# Patient Record
Sex: FEMALE | Race: WHITE | URBAN - METROPOLITAN AREA
[De-identification: names, ages, dates, MRNs, and addresses within clinical notes are randomized per-mention and may not be internally consistent; named-entity substitution may affect disease eponyms.]

---

## 2021-08-27 ENCOUNTER — APPOINTMENT (OUTPATIENT)
Dept: GENERAL RADIOLOGY | Age: 51
DRG: 177 | End: 2021-08-27
Attending: STUDENT IN AN ORGANIZED HEALTH CARE EDUCATION/TRAINING PROGRAM
Payer: COMMERCIAL

## 2021-08-27 ENCOUNTER — HOSPITAL ENCOUNTER (INPATIENT)
Age: 51
LOS: 4 days | Discharge: HOME | DRG: 177 | End: 2021-08-31
Attending: STUDENT IN AN ORGANIZED HEALTH CARE EDUCATION/TRAINING PROGRAM | Admitting: INTERNAL MEDICINE
Payer: COMMERCIAL

## 2021-08-27 DIAGNOSIS — U07.1 PNEUMONIA DUE TO COVID-19 VIRUS: Primary | ICD-10-CM

## 2021-08-27 DIAGNOSIS — J12.82 PNEUMONIA DUE TO COVID-19 VIRUS: Primary | ICD-10-CM

## 2021-08-27 LAB
ALBUMIN SERPL-MCNC: 2.6 G/DL (ref 3.5–5)
ALBUMIN/GLOB SERPL: 0.5 {RATIO} (ref 1.1–2.2)
ALP SERPL-CCNC: 59 U/L (ref 45–117)
ALT SERPL-CCNC: 30 U/L (ref 12–78)
ANION GAP SERPL CALC-SCNC: 7 MMOL/L (ref 5–15)
APTT PPP: 28.5 SEC (ref 22.1–31)
AST SERPL-CCNC: 22 U/L (ref 15–37)
BASOPHILS # BLD: 0 K/UL (ref 0–0.1)
BASOPHILS NFR BLD: 0 % (ref 0–1)
BILIRUB SERPL-MCNC: 0.3 MG/DL (ref 0.2–1)
BUN SERPL-MCNC: 8 MG/DL (ref 6–20)
BUN/CREAT SERPL: 11 (ref 12–20)
CALCIUM SERPL-MCNC: 8.3 MG/DL (ref 8.5–10.1)
CHLORIDE SERPL-SCNC: 104 MMOL/L (ref 97–108)
CO2 SERPL-SCNC: 26 MMOL/L (ref 21–32)
COVID-19 RAPID TEST, COVR: DETECTED
CREAT SERPL-MCNC: 0.76 MG/DL (ref 0.55–1.02)
CRP SERPL-MCNC: 16.2 MG/DL (ref 0–0.6)
D DIMER PPP FEU-MCNC: 1 MG/L FEU (ref 0–0.65)
DIFFERENTIAL METHOD BLD: ABNORMAL
EOSINOPHIL # BLD: 0 K/UL (ref 0–0.4)
EOSINOPHIL NFR BLD: 0 % (ref 0–7)
ERYTHROCYTE [DISTWIDTH] IN BLOOD BY AUTOMATED COUNT: 14.5 % (ref 11.5–14.5)
FERRITIN SERPL-MCNC: 144 NG/ML (ref 26–388)
FIBRINOGEN PPP-MCNC: 762 MG/DL (ref 200–475)
GLOBULIN SER CALC-MCNC: 5 G/DL (ref 2–4)
GLUCOSE SERPL-MCNC: 94 MG/DL (ref 65–100)
HCT VFR BLD AUTO: 36.6 % (ref 35–47)
HGB BLD-MCNC: 12 G/DL (ref 11.5–16)
IMM GRANULOCYTES # BLD AUTO: 0.1 K/UL (ref 0–0.04)
IMM GRANULOCYTES NFR BLD AUTO: 1 % (ref 0–0.5)
INR PPP: 0.9 (ref 0.9–1.1)
LACTATE SERPL-SCNC: 1.3 MMOL/L (ref 0.4–2)
LDH SERPL L TO P-CCNC: 229 U/L (ref 81–246)
LYMPHOCYTES # BLD: 0.9 K/UL (ref 0.8–3.5)
LYMPHOCYTES NFR BLD: 12 % (ref 12–49)
MAGNESIUM SERPL-MCNC: 2.1 MG/DL (ref 1.6–2.4)
MCH RBC QN AUTO: 28.8 PG (ref 26–34)
MCHC RBC AUTO-ENTMCNC: 32.8 G/DL (ref 30–36.5)
MCV RBC AUTO: 87.8 FL (ref 80–99)
MONOCYTES # BLD: 0.3 K/UL (ref 0–1)
MONOCYTES NFR BLD: 4 % (ref 5–13)
NEUTS SEG # BLD: 6.4 K/UL (ref 1.8–8)
NEUTS SEG NFR BLD: 83 % (ref 32–75)
NRBC # BLD: 0 K/UL (ref 0–0.01)
NRBC BLD-RTO: 0 PER 100 WBC
PLATELET # BLD AUTO: 197 K/UL (ref 150–400)
PMV BLD AUTO: 10.4 FL (ref 8.9–12.9)
POTASSIUM SERPL-SCNC: 3.3 MMOL/L (ref 3.5–5.1)
PROCALCITONIN SERPL-MCNC: <0.05 NG/ML
PROT SERPL-MCNC: 7.6 G/DL (ref 6.4–8.2)
PROTHROMBIN TIME: 9.9 SEC (ref 9–11.1)
RBC # BLD AUTO: 4.17 M/UL (ref 3.8–5.2)
SODIUM SERPL-SCNC: 137 MMOL/L (ref 136–145)
SOURCE, COVRS: ABNORMAL
THERAPEUTIC RANGE,PTTT: NORMAL SECS (ref 58–77)
TROPONIN I SERPL-MCNC: <0.05 NG/ML
WBC # BLD AUTO: 7.7 K/UL (ref 3.6–11)

## 2021-08-27 PROCEDURE — 94640 AIRWAY INHALATION TREATMENT: CPT

## 2021-08-27 PROCEDURE — 65660000000 HC RM CCU STEPDOWN

## 2021-08-27 PROCEDURE — 71045 X-RAY EXAM CHEST 1 VIEW: CPT

## 2021-08-27 PROCEDURE — 85379 FIBRIN DEGRADATION QUANT: CPT

## 2021-08-27 PROCEDURE — 36415 COLL VENOUS BLD VENIPUNCTURE: CPT

## 2021-08-27 PROCEDURE — 77010033678 HC OXYGEN DAILY

## 2021-08-27 PROCEDURE — 94761 N-INVAS EAR/PLS OXIMETRY MLT: CPT

## 2021-08-27 PROCEDURE — 82728 ASSAY OF FERRITIN: CPT

## 2021-08-27 PROCEDURE — 83735 ASSAY OF MAGNESIUM: CPT

## 2021-08-27 PROCEDURE — 84484 ASSAY OF TROPONIN QUANT: CPT

## 2021-08-27 PROCEDURE — 84145 PROCALCITONIN (PCT): CPT

## 2021-08-27 PROCEDURE — 74011250637 HC RX REV CODE- 250/637: Performed by: INTERNAL MEDICINE

## 2021-08-27 PROCEDURE — 74011000258 HC RX REV CODE- 258: Performed by: INTERNAL MEDICINE

## 2021-08-27 PROCEDURE — 99285 EMERGENCY DEPT VISIT HI MDM: CPT

## 2021-08-27 PROCEDURE — 85384 FIBRINOGEN ACTIVITY: CPT

## 2021-08-27 PROCEDURE — 85730 THROMBOPLASTIN TIME PARTIAL: CPT

## 2021-08-27 PROCEDURE — 74011636637 HC RX REV CODE- 636/637: Performed by: INTERNAL MEDICINE

## 2021-08-27 PROCEDURE — 85610 PROTHROMBIN TIME: CPT

## 2021-08-27 PROCEDURE — 85025 COMPLETE CBC W/AUTO DIFF WBC: CPT

## 2021-08-27 PROCEDURE — 86140 C-REACTIVE PROTEIN: CPT

## 2021-08-27 PROCEDURE — 96374 THER/PROPH/DIAG INJ IV PUSH: CPT

## 2021-08-27 PROCEDURE — 83605 ASSAY OF LACTIC ACID: CPT

## 2021-08-27 PROCEDURE — 77030012341 HC CHMB SPCR OPTC MDI VYRM -A

## 2021-08-27 PROCEDURE — 83615 LACTATE (LD) (LDH) ENZYME: CPT

## 2021-08-27 PROCEDURE — 74011250637 HC RX REV CODE- 250/637: Performed by: STUDENT IN AN ORGANIZED HEALTH CARE EDUCATION/TRAINING PROGRAM

## 2021-08-27 PROCEDURE — 87635 SARS-COV-2 COVID-19 AMP PRB: CPT

## 2021-08-27 PROCEDURE — 74011250636 HC RX REV CODE- 250/636: Performed by: STUDENT IN AN ORGANIZED HEALTH CARE EDUCATION/TRAINING PROGRAM

## 2021-08-27 PROCEDURE — 80053 COMPREHEN METABOLIC PANEL: CPT

## 2021-08-27 PROCEDURE — 74011250637 HC RX REV CODE- 250/637: Performed by: GENERAL ACUTE CARE HOSPITAL

## 2021-08-27 PROCEDURE — 93005 ELECTROCARDIOGRAM TRACING: CPT

## 2021-08-27 RX ORDER — BENZONATATE 100 MG/1
100 CAPSULE ORAL
Status: DISCONTINUED | OUTPATIENT
Start: 2021-08-27 | End: 2021-08-31 | Stop reason: HOSPADM

## 2021-08-27 RX ORDER — DIPHENHYDRAMINE HYDROCHLORIDE 50 MG/ML
50 INJECTION, SOLUTION INTRAMUSCULAR; INTRAVENOUS
Status: ACTIVE | OUTPATIENT
Start: 2021-08-27 | End: 2021-08-28

## 2021-08-27 RX ORDER — POTASSIUM CHLORIDE 750 MG/1
40 TABLET, FILM COATED, EXTENDED RELEASE ORAL
Status: COMPLETED | OUTPATIENT
Start: 2021-08-27 | End: 2021-08-27

## 2021-08-27 RX ORDER — FAMOTIDINE 20 MG/1
20 TABLET, FILM COATED ORAL DAILY
Status: DISCONTINUED | OUTPATIENT
Start: 2021-08-28 | End: 2021-08-31 | Stop reason: HOSPADM

## 2021-08-27 RX ORDER — DEXAMETHASONE SODIUM PHOSPHATE 4 MG/ML
6 INJECTION, SOLUTION INTRA-ARTICULAR; INTRALESIONAL; INTRAMUSCULAR; INTRAVENOUS; SOFT TISSUE
Status: COMPLETED | OUTPATIENT
Start: 2021-08-27 | End: 2021-08-27

## 2021-08-27 RX ORDER — EPINEPHRINE 1 MG/ML
0.3 INJECTION INTRAMUSCULAR; INTRAVENOUS; SUBCUTANEOUS
Status: DISCONTINUED | OUTPATIENT
Start: 2021-08-27 | End: 2021-08-31 | Stop reason: HOSPADM

## 2021-08-27 RX ORDER — ACETAMINOPHEN 325 MG/1
650 TABLET ORAL
Status: DISCONTINUED | OUTPATIENT
Start: 2021-08-27 | End: 2021-08-31 | Stop reason: HOSPADM

## 2021-08-27 RX ORDER — GUAIFENESIN 100 MG/5ML
100 SOLUTION ORAL
Status: DISCONTINUED | OUTPATIENT
Start: 2021-08-27 | End: 2021-08-31 | Stop reason: HOSPADM

## 2021-08-27 RX ORDER — DEXAMETHASONE 4 MG/1
6 TABLET ORAL DAILY
Status: DISCONTINUED | OUTPATIENT
Start: 2021-08-28 | End: 2021-08-28

## 2021-08-27 RX ADMIN — IPRATROPIUM BROMIDE AND ALBUTEROL 1 PUFF: 20; 100 SPRAY, METERED RESPIRATORY (INHALATION) at 16:32

## 2021-08-27 RX ADMIN — POTASSIUM CHLORIDE 40 MEQ: 750 TABLET, FILM COATED, EXTENDED RELEASE ORAL at 17:02

## 2021-08-27 RX ADMIN — BENZONATATE 100 MG: 100 CAPSULE ORAL at 18:26

## 2021-08-27 RX ADMIN — IPRATROPIUM BROMIDE AND ALBUTEROL 1 PUFF: 20; 100 SPRAY, METERED RESPIRATORY (INHALATION) at 20:48

## 2021-08-27 RX ADMIN — DEXAMETHASONE SODIUM PHOSPHATE 6 MG: 4 INJECTION, SOLUTION INTRAMUSCULAR; INTRAVENOUS at 13:09

## 2021-08-27 RX ADMIN — ACETAMINOPHEN 650 MG: 325 TABLET ORAL at 18:31

## 2021-08-27 RX ADMIN — TOCILIZUMAB 648 MG: 180 INJECTION, SOLUTION SUBCUTANEOUS at 20:48

## 2021-08-27 NOTE — CONSULTS
Pulmonary, Critical Care, and Sleep Medicine    Initial Patient Consult    Name: Minerva Wise MRN: [id number]   : 1970 Hospital: [facility]   Date: 2021        IMPRESSION:   · Acute Covid Pneumonia, unvaccinated. bilateral, Multilobar infiltrates. +test . · Covid related inflammation with CRP of 16.2. · Low procalcitonin  · Mild Hypoxia on 2L. · Hypokalemia  · MIld GERD. RECOMMENDATIONS:   · ON Decadron for 10 days  · For Actemra  · Adjust oxygen to keep pox over 90%. · Will follow along with you. Subjective:     Cc: Shortness of breath    HPI:     This patient has been seen and evaluated at the request of Dr. Javan Jerry for above. Patient is a 46 y.o. female Who presents for evaluation of  Dyspnea. Tested positive for Covid at [facility]. Has had 12 days of symptoms. Has increased dyspnea on Exertion. Associated fevers, dyspnea, cough productive of brown sputum. Has associated diarrhea. NO abdominal pain or vomiting. Has pleuritic chest pain worse with coughing. Has acute worsening dyspnea. NO chest pain, no back pain. No past medical history on file. No past surgical history on file. Prior to Admission medications    Not on File     Allergies   Allergen Reactions    Erythromycin Rash      Social History     Tobacco Use    Smoking status: Not on file   Substance Use Topics    Alcohol use: Not on file      No family history on file.      Current Facility-Administered Medications   Medication Dose Route Frequency    ipratropium-albuterol (COMBIVENT RESPIMAT) 20 mcg-100 mcg inhalation spray  1 Puff Inhalation Q4H RT    [START ON 2021] dexAMETHasone (DECADRON) tablet 6 mg  6 mg Oral DAILY       Review of Systems:  Constitutional: positive for fevers, chills, fatigue and malaise  Eyes: negative  Ears, nose, mouth, throat, and face: negative  Respiratory: positive for cough, pleurisy/chest pain or dyspnea on exertion  Cardiovascular: positive for chest pressure/discomfort, dyspnea, paroxysmal nocturnal dyspnea, lower extremity edema  Gastrointestinal: positive for reflux symptoms  Genitourinary:negative  Integument/breast: negative  Hematologic/lymphatic: negative  Musculoskeletal:negative  Neurological: negative  Behavioral/Psych: negative  Endocrine: negative  Allergic/Immunologic: negative    Objective:   Vital Signs:    Visit Vitals  /72   Pulse 85   Temp 99 °F (37.2 °C)   Resp 28   Ht 5' 1\" (1.549 m)   Wt 87.1 kg (192 lb 0.3 oz)   SpO2 92%   BMI 36.28 kg/m²       O2 Device: Nasal cannula   O2 Flow Rate (L/min): 2 l/min   Temp (24hrs), Av.4 °F (37.4 °C), Min:99 °F (37.2 °C), Max:99.7 °F (37.6 °C)       Intake/Output:   Last shift:      No intake/output data recorded. Last 3 shifts: No intake/output data recorded. No intake or output data in the 24 hours ending 21 1444   Physical Exam:   General:  Alert, cooperative, no distress, appears stated age. On NC oxygen. Head:  Normocephalic, without obvious abnormality, atraumatic. Eyes:  Conjunctivae/corneas clear. PERRL, EOMs intact. Nose: Nares normal. Septum midline. Mucosa normal. No drainage or sinus tenderness. Throat: Lips, mucosa, and tongue normal. Teeth and gums normal.   Neck: Supple, symmetrical, trachea midline, no adenopathy, thyroid: no enlargment/tenderness/nodules, no carotid bruit and no JVD. Back:   Symmetric, no curvature. ROM normal.   Lungs:   Clear to auscultation bilaterally. Decreased Bs In bases. Chest wall:  No tenderness or deformity. Heart:  Regular rate and rhythm, S1, S2 normal, no murmur, click, rub or gallop. Abdomen:   Soft, non-tender. Bowel sounds normal. No masses,  No organomegaly. Obese   Extremities: Extremities normal, atraumatic, no cyanosis or edema. Pulses: 2+ and symmetric all extremities.    Skin: Skin color, texture, turgor normal. No rashes or lesions   Lymph nodes: Cervical, supraclavicular, and axillary nodes normal.   Neurologic: Grossly nonfocal, motor is intact. Data review:     Recent Results (from the past 24 hour(s))   EKG, 12 LEAD, INITIAL    Collection Time: 08/27/21 12:52 PM   Result Value Ref Range    Ventricular Rate 91 BPM    Atrial Rate 91 BPM    P-R Interval 148 ms    QRS Duration 70 ms    Q-T Interval 348 ms    QTC Calculation (Bezet) 428 ms    Calculated P Axis 10 degrees    Calculated R Axis -12 degrees    Calculated T Axis -5 degrees    Diagnosis       Normal sinus rhythm  Minimal voltage criteria for LVH, may be normal variant  Cannot rule out Anterior infarct , age undetermined  No previous ECGs available     CBC WITH AUTOMATED DIFF    Collection Time: 08/27/21  1:08 PM   Result Value Ref Range    WBC 7.7 3.6 - 11.0 K/uL    RBC 4.17 3.80 - 5.20 M/uL    HGB 12.0 11.5 - 16.0 g/dL    HCT 36.6 35.0 - 47.0 %    MCV 87.8 80.0 - 99.0 FL    MCH 28.8 26.0 - 34.0 PG    MCHC 32.8 30.0 - 36.5 g/dL    RDW 14.5 11.5 - 14.5 %    PLATELET 093 403 - 620 K/uL    MPV 10.4 8.9 - 12.9 FL    NRBC 0.0 0  WBC    ABSOLUTE NRBC 0.00 0.00 - 0.01 K/uL    NEUTROPHILS 83 (H) 32 - 75 %    LYMPHOCYTES 12 12 - 49 %    MONOCYTES 4 (L) 5 - 13 %    EOSINOPHILS 0 0 - 7 %    BASOPHILS 0 0 - 1 %    IMMATURE GRANULOCYTES 1 (H) 0.0 - 0.5 %    ABS. NEUTROPHILS 6.4 1.8 - 8.0 K/UL    ABS. LYMPHOCYTES 0.9 0.8 - 3.5 K/UL    ABS. MONOCYTES 0.3 0.0 - 1.0 K/UL    ABS. EOSINOPHILS 0.0 0.0 - 0.4 K/UL    ABS. BASOPHILS 0.0 0.0 - 0.1 K/UL    ABS. IMM.  GRANS. 0.1 (H) 0.00 - 0.04 K/UL    DF AUTOMATED     METABOLIC PANEL, COMPREHENSIVE    Collection Time: 08/27/21  1:08 PM   Result Value Ref Range    Sodium 137 136 - 145 mmol/L    Potassium 3.3 (L) 3.5 - 5.1 mmol/L    Chloride 104 97 - 108 mmol/L    CO2 26 21 - 32 mmol/L    Anion gap 7 5 - 15 mmol/L    Glucose 94 65 - 100 mg/dL    BUN 8 6 - 20 MG/DL    Creatinine 0.76 0.55 - 1.02 MG/DL    BUN/Creatinine ratio 11 (L) 12 - 20      GFR est AA >60 >60 ml/min/1.73m2    GFR est non-AA >60 >60 ml/min/1.73m2    Calcium 8.3 (L) 8.5 - 10.1 MG/DL    Bilirubin, total 0.3 0.2 - 1.0 MG/DL    ALT (SGPT) 30 12 - 78 U/L    AST (SGOT) 22 15 - 37 U/L    Alk. phosphatase 59 45 - 117 U/L    Protein, total 7.6 6.4 - 8.2 g/dL    Albumin 2.6 (L) 3.5 - 5.0 g/dL    Globulin 5.0 (H) 2.0 - 4.0 g/dL    A-G Ratio 0.5 (L) 1.1 - 2.2     MAGNESIUM    Collection Time: 08/27/21  1:08 PM   Result Value Ref Range    Magnesium 2.1 1.6 - 2.4 mg/dL   PROTHROMBIN TIME + INR    Collection Time: 08/27/21  1:08 PM   Result Value Ref Range    INR 0.9 0.9 - 1.1      Prothrombin time 9.9 9.0 - 11.1 sec   PTT    Collection Time: 08/27/21  1:08 PM   Result Value Ref Range    aPTT 28.5 22.1 - 31.0 sec    aPTT, therapeutic range     58.0 - 77.0 SECS   PROCALCITONIN    Collection Time: 08/27/21  1:08 PM   Result Value Ref Range    Procalcitonin <0.05 ng/mL   C REACTIVE PROTEIN, QT    Collection Time: 08/27/21  1:08 PM   Result Value Ref Range    C-Reactive protein 16.20 (H) 0.00 - 0.60 mg/dL   LD    Collection Time: 08/27/21  1:08 PM   Result Value Ref Range     81 - 246 U/L   D DIMER    Collection Time: 08/27/21  1:08 PM   Result Value Ref Range    D-dimer 1.00 (H) 0.00 - 0.65 mg/L FEU   TROPONIN I    Collection Time: 08/27/21  1:08 PM   Result Value Ref Range    Troponin-I, Qt. <0.05 <0.05 ng/mL   LACTIC ACID    Collection Time: 08/27/21  1:08 PM   Result Value Ref Range    Lactic acid 1.3 0.4 - 2.0 MMOL/L   COVID-19 RAPID TEST    Collection Time: 08/27/21  1:08 PM   Result Value Ref Range    Specimen source Nasopharyngeal      COVID-19 rapid test Detected (AA) NOTD     FIBRINOGEN    Collection Time: 08/27/21  1:08 PM   Result Value Ref Range    Fibrinogen 762 (H) 200 - 475 mg/dL       Imaging:  I have personally reviewed the patients radiographs and have reviewed the reports:  8-27-21: COMPARISON: None.     FINDINGS: A portable AP radiograph of the chest was obtained at 1309 hours. The  patient is on a cardiac monitor.  Heart size is normal. The lungs demonstrate low  lung volumes. There are bilateral interstitial and airspace opacity in the mid  lower lung zones. This is consistent with Covid pneumonia. Osseous structures  are unremarkable.  No pleural effusions.     IMPRESSION  Bilateral interstitial and airspace infiltrates are consistent with Covid  pneumonia        Aaliyah Doyle MD

## 2021-08-27 NOTE — H&P
Hospitalist Admission Note    NAME: Andres Taylor   :  1970   MRN:  [id number]     Date/Time:  2021 3:54 PM    Patient PCP: Mary Stuart MD  ______________________________________________________________________  Given the patient's current clinical presentation, I have a high level of concern for decompensation if discharged from the emergency department. Complex decision making was performed, which includes reviewing the patient's available past medical records, laboratory results, and x-ray films. My assessment of this patient's clinical condition and my plan of care is as follows. Assessment / Plan:  Acute hypoxic respiratory failure, secondary to COVID-19 PNA  Admit to Respiratory floor  Droplet Plus isolation  Decadron x 10 days  Pulmonary Consult  Pharmacy for Actemra  Symptoms started 12 days ago, and her first positive test was from  at [facility]   IS, Combivent, Guaifenisin  Continue O2 supplementation - wean as tolerated, currently on 2L NC  Procal negative, will not order any abx    CXR: IMPRESSION  Bilateral interstitial and airspace infiltrates are consistent with Covid  pneumonia    Hypokalemia  K 3.3, replete    Code Status: Full  Surrogate Decision Maker: Spouse  DVT Prophylaxis: Lovenox  GI Prophylaxis: not indicated  Baseline: Independent      Subjective:   CHIEF COMPLAINT: SOB    HISTORY OF PRESENT ILLNESS:     Alvin Pool is a 46 y.o.  female who presents with CC listed above. Pt states that she has been feeling unwell for the past 12 days. She has NOT been vaccinated against SARS-CoV2. She believes she contracted the virus while at a party in [place]. She states that she was tested at [facility] last Wednesday and found to be positive. She states that her 6year old son is also ill but that he is doing better. She endorses PATEL and a non productive cough. We were asked to admit for work up and evaluation of the above problems.      No past medical history on file. No past surgical history on file. Social History     Tobacco Use    Smoking status: Not on file   Substance Use Topics    Alcohol use: Not on file        No family history on file. Not on File     Prior to Admission medications    Not on File       REVIEW OF SYSTEMS:     I am not able to complete the review of systems because:    The patient is intubated and sedated    The patient has altered mental status due to his acute medical problems    The patient has baseline aphasia from prior stroke(s)    The patient has baseline dementia and is not reliable historian    The patient is in acute medical distress and unable to provide information           Total of 12 systems reviewed as follows:       POSITIVE= underlined text  Negative = text not underlined  General:  fever, chills, sweats, generalized weakness, weight loss/gain,      loss of appetite   Eyes:    blurred vision, eye pain, loss of vision, double vision  ENT:    rhinorrhea, pharyngitis   Respiratory:   cough, sputum production, SOB, PATEL, wheezing, pleuritic pain   Cardiology:   chest pain, palpitations, orthopnea, PND, edema, syncope   Gastrointestinal:  abdominal pain , N/V, diarrhea, dysphagia, constipation, bleeding   Genitourinary:  frequency, urgency, dysuria, hematuria, incontinence   Muskuloskeletal :  arthralgia, myalgia, back pain  Hematology:  easy bruising, nose or gum bleeding, lymphadenopathy   Dermatological: rash, ulceration, pruritis, color change / jaundice  Endocrine:   hot flashes or polydipsia   Neurological:  headache, dizziness, confusion, focal weakness, paresthesia,     Speech difficulties, memory loss, gait difficulty  Psychological: Feelings of anxiety, depression, agitation    Objective:   VITALS:    Visit Vitals  /65   Pulse 90   Temp 99.7 °F (37.6 °C)   Resp 17   Ht 5' 1\" (1.549 m)   Wt 87.1 kg (192 lb 0.3 oz)   SpO2 94%   BMI 36.28 kg/m²       PHYSICAL EXAM:    General:    Alert, cooperative, no distress, appears stated age. HEENT: Atraumatic, anicteric sclerae, pink conjunctivae  Neck:  Supple, symmetrical  Lungs:   Coarse BS, on 2L NC  Chest wall:  No tenderness  No Accessory muscle use. Heart:   Regular  rhythm,  No  murmur   No edema  Abdomen:   Soft, non-tender. Not distended. Bowel sounds normal  Extremities: No cyanosis. No clubbing,      Skin turgor normal, Capillary refill normal, Radial dial pulse 2+  Skin:     Not pale. Not Jaundiced  No rashes   Psych:  Good insight. Not depressed. Not anxious or agitated. Neurologic: EOMs intact. No facial asymmetry. No aphasia or slurred speech. Symmetrical strength, Sensation grossly intact. Alert and oriented X 4.     _______________________________________________________________________  Care Plan discussed with:    Comments   Patient x    Family      RN x    Care Manager                    Consultant:      _______________________________________________________________________  Expected  Disposition:   Home with Family x   HH/PT/OT/RN    SNF/LTC    [facility]    ________________________________________________________________________  TOTAL TIME:  54 Minutes    Critical Care Provided     Minutes non procedure based      Comments     Reviewed previous records   >50% of visit spent in counseling and coordination of care  Discussion with patient and/or family and questions answered       ________________________________________________________________________  Signed: Sidney Melgar MD    Procedures: see electronic medical records for all procedures/Xrays and details which were not copied into this note but were reviewed prior to creation of Plan.     LAB DATA REVIEWED:    Recent Results (from the past 24 hour(s))   EKG, 12 LEAD, INITIAL    Collection Time: 08/27/21 12:52 PM   Result Value Ref Range    Ventricular Rate 91 BPM    Atrial Rate 91 BPM    P-R Interval 148 ms    QRS Duration 70 ms    Q-T Interval 348 ms    QTC Calculation (Bezet) 428 ms    Calculated P Axis 10 degrees    Calculated R Axis -12 degrees    Calculated T Axis -5 degrees    Diagnosis       Normal sinus rhythm  Minimal voltage criteria for LVH, may be normal variant  Cannot rule out Anterior infarct , age undetermined  No previous ECGs available     CBC WITH AUTOMATED DIFF    Collection Time: 08/27/21  1:08 PM   Result Value Ref Range    WBC 7.7 3.6 - 11.0 K/uL    RBC 4.17 3.80 - 5.20 M/uL    HGB 12.0 11.5 - 16.0 g/dL    HCT 36.6 35.0 - 47.0 %    MCV 87.8 80.0 - 99.0 FL    MCH 28.8 26.0 - 34.0 PG    MCHC 32.8 30.0 - 36.5 g/dL    RDW 14.5 11.5 - 14.5 %    PLATELET 009 767 - 364 K/uL    MPV 10.4 8.9 - 12.9 FL    NRBC 0.0 0  WBC    ABSOLUTE NRBC 0.00 0.00 - 0.01 K/uL    NEUTROPHILS 83 (H) 32 - 75 %    LYMPHOCYTES 12 12 - 49 %    MONOCYTES 4 (L) 5 - 13 %    EOSINOPHILS 0 0 - 7 %    BASOPHILS 0 0 - 1 %    IMMATURE GRANULOCYTES 1 (H) 0.0 - 0.5 %    ABS. NEUTROPHILS 6.4 1.8 - 8.0 K/UL    ABS. LYMPHOCYTES 0.9 0.8 - 3.5 K/UL    ABS. MONOCYTES 0.3 0.0 - 1.0 K/UL    ABS. EOSINOPHILS 0.0 0.0 - 0.4 K/UL    ABS. BASOPHILS 0.0 0.0 - 0.1 K/UL    ABS. IMM. GRANS. 0.1 (H) 0.00 - 0.04 K/UL    DF AUTOMATED     METABOLIC PANEL, COMPREHENSIVE    Collection Time: 08/27/21  1:08 PM   Result Value Ref Range    Sodium 137 136 - 145 mmol/L    Potassium 3.3 (L) 3.5 - 5.1 mmol/L    Chloride 104 97 - 108 mmol/L    CO2 26 21 - 32 mmol/L    Anion gap 7 5 - 15 mmol/L    Glucose 94 65 - 100 mg/dL    BUN 8 6 - 20 MG/DL    Creatinine 0.76 0.55 - 1.02 MG/DL    BUN/Creatinine ratio 11 (L) 12 - 20      GFR est AA >60 >60 ml/min/1.73m2    GFR est non-AA >60 >60 ml/min/1.73m2    Calcium 8.3 (L) 8.5 - 10.1 MG/DL    Bilirubin, total 0.3 0.2 - 1.0 MG/DL    ALT (SGPT) 30 12 - 78 U/L    AST (SGOT) 22 15 - 37 U/L    Alk.  phosphatase 59 45 - 117 U/L    Protein, total 7.6 6.4 - 8.2 g/dL    Albumin 2.6 (L) 3.5 - 5.0 g/dL    Globulin 5.0 (H) 2.0 - 4.0 g/dL    A-G Ratio 0.5 (L) 1.1 - 2.2     MAGNESIUM    Collection Time: 08/27/21  1:08 PM   Result Value Ref Range    Magnesium 2.1 1.6 - 2.4 mg/dL   PROTHROMBIN TIME + INR    Collection Time: 08/27/21  1:08 PM   Result Value Ref Range    INR 0.9 0.9 - 1.1      Prothrombin time 9.9 9.0 - 11.1 sec   PTT    Collection Time: 08/27/21  1:08 PM   Result Value Ref Range    aPTT 28.5 22.1 - 31.0 sec    aPTT, therapeutic range     58.0 - 77.0 SECS   PROCALCITONIN    Collection Time: 08/27/21  1:08 PM   Result Value Ref Range    Procalcitonin <0.05 ng/mL   LD    Collection Time: 08/27/21  1:08 PM   Result Value Ref Range     81 - 246 U/L   D DIMER    Collection Time: 08/27/21  1:08 PM   Result Value Ref Range    D-dimer 1.00 (H) 0.00 - 0.65 mg/L FEU   TROPONIN I    Collection Time: 08/27/21  1:08 PM   Result Value Ref Range    Troponin-I, Qt. <0.05 <0.05 ng/mL   LACTIC ACID    Collection Time: 08/27/21  1:08 PM   Result Value Ref Range    Lactic acid 1.3 0.4 - 2.0 MMOL/L   COVID-19 RAPID TEST    Collection Time: 08/27/21  1:08 PM   Result Value Ref Range    Specimen source Nasopharyngeal      COVID-19 rapid test Detected (AA) NOTD     FIBRINOGEN    Collection Time: 08/27/21  1:08 PM   Result Value Ref Range    Fibrinogen 762 (H) 200 - 475 mg/dL

## 2021-08-27 NOTE — ED PROVIDER NOTES
EMERGENCY DEPARTMENT HISTORY AND PHYSICAL EXAM      Date: 8/27/2021  Patient Name: Minerva Wise    History of Presenting Illness     Chief Complaint   Patient presents with    Shortness of Breath     a week    Positive For Covid-19     wednesday a week ago         HPI: Minerva Wise, 46 y.o. female presents to the ED with cc of shortness of breath. She tested positive for Covid last Wednesday at [facility]. She started having symptoms about 12 days ago, she reports increasing shortness of breath, fevers, and a cough productive of brownish sputum. She has a pulse oximeter at home, and it was reading consistently in the 80s this morning. She reports some mild diarrhea, no abdominal pain or vomiting, no dysuria or hematuria. She reports some mild substernal chest pain only sometimes with coughing. She did not receive either of the vaccines. There are no other complaints, changes, or physical findings at this time. PCP: Nirmal Malave MD    Medications: Wellbutrin, Prozac    Past History     Past Medical History:  Anxiety    Past Surgical History:  Reviewed and nonpertinent    Family History:  Reviewed and nonpertinent    Social History:  Denies tobacco or illicit drug use, reports occasional alcohol    Allergies:  No known drug allergies      Review of Systems   Reports fever  No eye pain  No ear pain  Reports shortness of breath  Reports intermittent with coughing chest pain  no abdominal pain  no dysuria  no leg pain  No rash    Physical Exam   Physical Exam  Constitutional:       Appearance: She is not toxic-appearing. Comments: Tachypneic without significant accessory muscle usage   HENT:      Head: Normocephalic and atraumatic. Eyes:      Extraocular Movements: Extraocular movements intact. Cardiovascular:      Rate and Rhythm: Normal rate and regular rhythm.    Pulmonary:      Comments: Patient becomes tachypneic on minimal exertion, she has coarse breath sounds on expiration diffusely bilaterally. She has 3-4 word dyspnea, she does not have significant accessory muscle usage. Abdominal:      Palpations: Abdomen is soft. Tenderness: There is no abdominal tenderness. Musculoskeletal:      Right lower leg: No tenderness. No edema. Left lower leg: No tenderness. No edema. Skin:     General: Skin is warm and dry. Neurological:      General: No focal deficit present. Mental Status: She is alert and oriented to person, place, and time. Psychiatric:         Mood and Affect: Mood normal.         Diagnostic Study Results     Labs -     Recent Results (from the past 24 hour(s))   EKG, 12 LEAD, INITIAL    Collection Time: 08/27/21 12:52 PM   Result Value Ref Range    Ventricular Rate 91 BPM    Atrial Rate 91 BPM    P-R Interval 148 ms    QRS Duration 70 ms    Q-T Interval 348 ms    QTC Calculation (Bezet) 428 ms    Calculated P Axis 10 degrees    Calculated R Axis -12 degrees    Calculated T Axis -5 degrees    Diagnosis       Normal sinus rhythm  Minimal voltage criteria for LVH, may be normal variant  Cannot rule out Anterior infarct , age undetermined  No previous ECGs available     CBC WITH AUTOMATED DIFF    Collection Time: 08/27/21  1:08 PM   Result Value Ref Range    WBC 7.7 3.6 - 11.0 K/uL    RBC 4.17 3.80 - 5.20 M/uL    HGB 12.0 11.5 - 16.0 g/dL    HCT 36.6 35.0 - 47.0 %    MCV 87.8 80.0 - 99.0 FL    MCH 28.8 26.0 - 34.0 PG    MCHC 32.8 30.0 - 36.5 g/dL    RDW 14.5 11.5 - 14.5 %    PLATELET 805 920 - 938 K/uL    MPV 10.4 8.9 - 12.9 FL    NRBC 0.0 0  WBC    ABSOLUTE NRBC 0.00 0.00 - 0.01 K/uL    NEUTROPHILS 83 (H) 32 - 75 %    LYMPHOCYTES 12 12 - 49 %    MONOCYTES 4 (L) 5 - 13 %    EOSINOPHILS 0 0 - 7 %    BASOPHILS 0 0 - 1 %    IMMATURE GRANULOCYTES 1 (H) 0.0 - 0.5 %    ABS. NEUTROPHILS 6.4 1.8 - 8.0 K/UL    ABS. LYMPHOCYTES 0.9 0.8 - 3.5 K/UL    ABS. MONOCYTES 0.3 0.0 - 1.0 K/UL    ABS. EOSINOPHILS 0.0 0.0 - 0.4 K/UL    ABS.  BASOPHILS 0.0 0.0 - 0.1 K/UL    ABS. IMM. GRANS. 0.1 (H) 0.00 - 0.04 K/UL    DF AUTOMATED     METABOLIC PANEL, COMPREHENSIVE    Collection Time: 08/27/21  1:08 PM   Result Value Ref Range    Sodium 137 136 - 145 mmol/L    Potassium 3.3 (L) 3.5 - 5.1 mmol/L    Chloride 104 97 - 108 mmol/L    CO2 26 21 - 32 mmol/L    Anion gap 7 5 - 15 mmol/L    Glucose 94 65 - 100 mg/dL    BUN 8 6 - 20 MG/DL    Creatinine 0.76 0.55 - 1.02 MG/DL    BUN/Creatinine ratio 11 (L) 12 - 20      GFR est AA >60 >60 ml/min/1.73m2    GFR est non-AA >60 >60 ml/min/1.73m2    Calcium 8.3 (L) 8.5 - 10.1 MG/DL    Bilirubin, total 0.3 0.2 - 1.0 MG/DL    ALT (SGPT) 30 12 - 78 U/L    AST (SGOT) 22 15 - 37 U/L    Alk.  phosphatase 59 45 - 117 U/L    Protein, total 7.6 6.4 - 8.2 g/dL    Albumin 2.6 (L) 3.5 - 5.0 g/dL    Globulin 5.0 (H) 2.0 - 4.0 g/dL    A-G Ratio 0.5 (L) 1.1 - 2.2     MAGNESIUM    Collection Time: 08/27/21  1:08 PM   Result Value Ref Range    Magnesium 2.1 1.6 - 2.4 mg/dL   PROTHROMBIN TIME + INR    Collection Time: 08/27/21  1:08 PM   Result Value Ref Range    INR 0.9 0.9 - 1.1      Prothrombin time 9.9 9.0 - 11.1 sec   PTT    Collection Time: 08/27/21  1:08 PM   Result Value Ref Range    aPTT 28.5 22.1 - 31.0 sec    aPTT, therapeutic range     58.0 - 77.0 SECS   LD    Collection Time: 08/27/21  1:08 PM   Result Value Ref Range     81 - 246 U/L   D DIMER    Collection Time: 08/27/21  1:08 PM   Result Value Ref Range    D-dimer 1.00 (H) 0.00 - 0.65 mg/L FEU   TROPONIN I    Collection Time: 08/27/21  1:08 PM   Result Value Ref Range    Troponin-I, Qt. <0.05 <0.05 ng/mL   LACTIC ACID    Collection Time: 08/27/21  1:08 PM   Result Value Ref Range    Lactic acid 1.3 0.4 - 2.0 MMOL/L   COVID-19 RAPID TEST    Collection Time: 08/27/21  1:08 PM   Result Value Ref Range    Specimen source Nasopharyngeal      COVID-19 rapid test Detected (AA) NOTD     FIBRINOGEN    Collection Time: 08/27/21  1:08 PM   Result Value Ref Range    Fibrinogen 762 (H) 200 - 475 mg/dL       Radiologic Studies -   XR CHEST PORT   Final Result   Bilateral interstitial and airspace infiltrates are consistent with Covid   pneumonia        CT Results  (Last 48 hours)    None        CXR Results  (Last 48 hours)               08/27/21 1327  XR CHEST PORT Final result    Impression:  Bilateral interstitial and airspace infiltrates are consistent with Covid   pneumonia       Narrative:  EXAM: XR CHEST PORT       INDICATION: Shortness of breath       COMPARISON: None. FINDINGS: A portable AP radiograph of the chest was obtained at 1309 hours. The   patient is on a cardiac monitor. Heart size is normal. The lungs demonstrate low   lung volumes. There are bilateral interstitial and airspace opacity in the mid   lower lung zones. This is consistent with Covid pneumonia. Osseous structures   are unremarkable. No pleural effusions. Medical Decision Making   I am the first provider for this patient. I reviewed the vital signs, available nursing notes, past medical history, past surgical history, family history and social history. Vital Signs-Reviewed the patient's vital signs. Patient Vitals for the past 24 hrs:   Temp Pulse Resp BP SpO2   08/27/21 1330  90 17 102/65 94 %   08/27/21 1314     94 %   08/27/21 1313     (!) 88 %   08/27/21 1240  92 29 125/76 92 %   08/27/21 1231 99.7 °F (37.6 °C) (!) 119 26 96/83 (!) 89 %         Provider Notes (Medical Decision Making):   51-year-old female, known to be Covid positive, presented with worsening shortness of breath. She is hypoxic on room air in the high 80s, tachycardic on presentation. Heart rate improved on my evaluation. Symptoms are concerning for worsening Covid pneumonia, will assess for any associated electrolyte or metabolic abnormalities, arrhythmia, volume depletion. ED Course:     Initial assessment performed.  The patients presenting problems have been discussed, and they are in agreement with the care plan formulated and outlined with them. I have encouraged them to ask questions as they arise throughout their visit. CBC negative for leukocytosis or anemia, basic metabolic panel with mild hypokalemia of 3.3, normal renal function, no worrisome electrolyte abnormalities. Troponin is negative. Chest x-ray shows bilateral opacities consistent with Covid pneumonia. She will be given Decadron 6 mg IV. EKG is performed at 12: 52, shows sinus rhythm at a rate of 91, , QRS 70, QTc 428, axis upright, no ST segment elevation or depression concerning for ACS, this is interpreted as normal sinus rhythm. On reevaluation, patient is now breathing comfortably on nasal cannula oxygen. She will be admitted. Critical Care Time:     CRITICAL CARE NOTE :    2:10 PM    IMPENDING DETERIORATION -Respiratory  ASSOCIATED RISK FACTORS - Hypoxia  MANAGEMENT- Bedside Assessment and Supervision of Care  INTERPRETATION -  Xrays, ECG and Blood Pressure  INTERVENTIONS -steroids, supplemental oxygen  CASE REVIEW - Hospitalist/Intensivist and Nursing  TREATMENT RESPONSE -Improved  PERFORMED BY - Self    NOTES   :  I have spent 40 minutes of critical care time involved in lab review, consultations with specialist, family decision- making, bedside attention and documentation. This time excludes time spent in any separate billed procedures. During this entire length of time I was immediately available to the patient . Lamont Gaffney MD      Disposition:  Admit    PLAN:  1. There are no discharge medications for this patient.     2.   Follow-up Information    None       Return to ED if worse     Diagnosis     Clinical Impression: Acute hypoxic respiratory failure secondary to Covid pneumonia

## 2021-08-27 NOTE — ED NOTES
Bedside shift change report given to MAC Todd and Alexis hyatt RN (oncoming nurse) by Isidro Madison RN (offgoing nurse). Report included the following information SBAR, Kardex, ED Summary, Intake/Output and MAR.     7606- Bedside shift change report given to Claus Almodovar RN (oncoming nurse) by Arminda Justice and MAC Todd (offgoing nurse). Report included the following information SBAR, Kardex, ED Summary and MAR.

## 2021-08-27 NOTE — PROGRESS NOTES
Remdesivir Therapy Evaluation & Monitoring Ivent    Lab Results   Component Value Date/Time    Creatinine 0.76 08/27/2021 01:08 PM    BUN/Creatinine ratio 11 (L) 08/27/2021 01:08 PM    GFR est AA >60 08/27/2021 01:08 PM    GFR est non-AA >60 08/27/2021 01:08 PM    Bilirubin, total 0.3 08/27/2021 01:08 PM    ALT (SGPT) 30 08/27/2021 01:08 PM    Alk.  phosphatase 59 08/27/2021 01:08 PM        Lab Results   Component Value Date/Time    WBC 7.7 08/27/2021 01:08 PM    HGB 12.0 08/27/2021 01:08 PM    HCT 36.6 08/27/2021 01:08 PM    PLATELET 398 01/56/0331 01:08 PM    MCV 87.8 08/27/2021 01:08 PM       Patient meets remdesivir criteria for use, supply is available at [facility], and renal & liver function are acceptable for remdesivir use: No  Onset of symptoms 12 days ago [facility] calls for within 7 days)  Waiting on CRP to result for actemra

## 2021-08-28 ENCOUNTER — APPOINTMENT (OUTPATIENT)
Dept: GENERAL RADIOLOGY | Age: 51
DRG: 177 | End: 2021-08-28
Attending: INTERNAL MEDICINE
Payer: COMMERCIAL

## 2021-08-28 LAB
ALBUMIN SERPL-MCNC: 2.6 G/DL (ref 3.5–5)
ALBUMIN/GLOB SERPL: 0.6 {RATIO} (ref 1.1–2.2)
ALP SERPL-CCNC: 58 U/L (ref 45–117)
ALT SERPL-CCNC: 27 U/L (ref 12–78)
ANION GAP SERPL CALC-SCNC: 8 MMOL/L (ref 5–15)
AST SERPL-CCNC: 19 U/L (ref 15–37)
ATRIAL RATE: 91 BPM
BASOPHILS # BLD: 0 K/UL (ref 0–0.1)
BASOPHILS NFR BLD: 0 % (ref 0–1)
BILIRUB SERPL-MCNC: 0.4 MG/DL (ref 0.2–1)
BNP SERPL-MCNC: 64 PG/ML
BUN SERPL-MCNC: 9 MG/DL (ref 6–20)
BUN/CREAT SERPL: 15 (ref 12–20)
CALCIUM SERPL-MCNC: 8.2 MG/DL (ref 8.5–10.1)
CALCULATED P AXIS, ECG09: 10 DEGREES
CALCULATED R AXIS, ECG10: -12 DEGREES
CALCULATED T AXIS, ECG11: -5 DEGREES
CHLORIDE SERPL-SCNC: 105 MMOL/L (ref 97–108)
CO2 SERPL-SCNC: 24 MMOL/L (ref 21–32)
CREAT SERPL-MCNC: 0.62 MG/DL (ref 0.55–1.02)
CRP SERPL HS-MCNC: >9.5 MG/L
CRP SERPL-MCNC: 12.9 MG/DL (ref 0–0.6)
DIAGNOSIS, 93000: NORMAL
DIFFERENTIAL METHOD BLD: ABNORMAL
EOSINOPHIL # BLD: 0 K/UL (ref 0–0.4)
EOSINOPHIL NFR BLD: 0 % (ref 0–7)
ERYTHROCYTE [DISTWIDTH] IN BLOOD BY AUTOMATED COUNT: 14.4 % (ref 11.5–14.5)
FERRITIN SERPL-MCNC: 141 NG/ML (ref 8–252)
GLOBULIN SER CALC-MCNC: 4.4 G/DL (ref 2–4)
GLUCOSE SERPL-MCNC: 131 MG/DL (ref 65–100)
HCT VFR BLD AUTO: 35.1 % (ref 35–47)
HGB BLD-MCNC: 11.6 G/DL (ref 11.5–16)
IMM GRANULOCYTES # BLD AUTO: 0 K/UL (ref 0–0.04)
IMM GRANULOCYTES NFR BLD AUTO: 0 % (ref 0–0.5)
LYMPHOCYTES # BLD: 0.4 K/UL (ref 0.8–3.5)
LYMPHOCYTES NFR BLD: 8 % (ref 12–49)
MAGNESIUM SERPL-MCNC: 2 MG/DL (ref 1.6–2.4)
MCH RBC QN AUTO: 28.7 PG (ref 26–34)
MCHC RBC AUTO-ENTMCNC: 33 G/DL (ref 30–36.5)
MCV RBC AUTO: 86.9 FL (ref 80–99)
MONOCYTES # BLD: 0.5 K/UL (ref 0–1)
MONOCYTES NFR BLD: 10 % (ref 5–13)
MYELOCYTES NFR BLD MANUAL: 1 %
NEUTS SEG # BLD: 3.7 K/UL (ref 1.8–8)
NEUTS SEG NFR BLD: 81 % (ref 32–75)
NRBC # BLD: 0 K/UL (ref 0–0.01)
NRBC BLD-RTO: 0 PER 100 WBC
P-R INTERVAL, ECG05: 148 MS
PHOSPHATE SERPL-MCNC: 1.7 MG/DL (ref 2.6–4.7)
PLATELET # BLD AUTO: 245 K/UL (ref 150–400)
PLATELET COMMENTS,PCOM: ABNORMAL
PMV BLD AUTO: 10.2 FL (ref 8.9–12.9)
POTASSIUM SERPL-SCNC: 4 MMOL/L (ref 3.5–5.1)
PROCALCITONIN SERPL-MCNC: <0.05 NG/ML
PROT SERPL-MCNC: 7 G/DL (ref 6.4–8.2)
Q-T INTERVAL, ECG07: 348 MS
QRS DURATION, ECG06: 70 MS
QTC CALCULATION (BEZET), ECG08: 428 MS
RBC # BLD AUTO: 4.04 M/UL (ref 3.8–5.2)
RBC MORPH BLD: ABNORMAL
SODIUM SERPL-SCNC: 137 MMOL/L (ref 136–145)
VENTRICULAR RATE, ECG03: 91 BPM
WBC # BLD AUTO: 4.6 K/UL (ref 3.6–11)
WBC MORPH BLD: ABNORMAL

## 2021-08-28 PROCEDURE — 83735 ASSAY OF MAGNESIUM: CPT

## 2021-08-28 PROCEDURE — 74011250637 HC RX REV CODE- 250/637: Performed by: GENERAL ACUTE CARE HOSPITAL

## 2021-08-28 PROCEDURE — XW033H5 INTRODUCTION OF TOCILIZUMAB INTO PERIPHERAL VEIN, PERCUTANEOUS APPROACH, NEW TECHNOLOGY GROUP 5: ICD-10-PCS | Performed by: INTERNAL MEDICINE

## 2021-08-28 PROCEDURE — 84100 ASSAY OF PHOSPHORUS: CPT

## 2021-08-28 PROCEDURE — 71045 X-RAY EXAM CHEST 1 VIEW: CPT

## 2021-08-28 PROCEDURE — 74011250637 HC RX REV CODE- 250/637: Performed by: STUDENT IN AN ORGANIZED HEALTH CARE EDUCATION/TRAINING PROGRAM

## 2021-08-28 PROCEDURE — 74011250636 HC RX REV CODE- 250/636: Performed by: INTERNAL MEDICINE

## 2021-08-28 PROCEDURE — 86141 C-REACTIVE PROTEIN HS: CPT

## 2021-08-28 PROCEDURE — 82728 ASSAY OF FERRITIN: CPT

## 2021-08-28 PROCEDURE — 80053 COMPREHEN METABOLIC PANEL: CPT

## 2021-08-28 PROCEDURE — 74011250637 HC RX REV CODE- 250/637: Performed by: INTERNAL MEDICINE

## 2021-08-28 PROCEDURE — 86140 C-REACTIVE PROTEIN: CPT

## 2021-08-28 PROCEDURE — 65270000029 HC RM PRIVATE

## 2021-08-28 PROCEDURE — 94640 AIRWAY INHALATION TREATMENT: CPT

## 2021-08-28 PROCEDURE — 36415 COLL VENOUS BLD VENIPUNCTURE: CPT

## 2021-08-28 PROCEDURE — 83880 ASSAY OF NATRIURETIC PEPTIDE: CPT

## 2021-08-28 PROCEDURE — 85025 COMPLETE CBC W/AUTO DIFF WBC: CPT

## 2021-08-28 PROCEDURE — 84145 PROCALCITONIN (PCT): CPT

## 2021-08-28 RX ORDER — DEXAMETHASONE SODIUM PHOSPHATE 100 MG/10ML
6 INJECTION INTRAMUSCULAR; INTRAVENOUS EVERY 24 HOURS
Status: DISCONTINUED | OUTPATIENT
Start: 2021-08-28 | End: 2021-08-29

## 2021-08-28 RX ORDER — FLUOXETINE HYDROCHLORIDE 20 MG/1
20 CAPSULE ORAL DAILY
COMMUNITY

## 2021-08-28 RX ORDER — GUAIFENESIN 600 MG/1
600 TABLET, EXTENDED RELEASE ORAL EVERY 12 HOURS
Status: DISCONTINUED | OUTPATIENT
Start: 2021-08-28 | End: 2021-08-31 | Stop reason: HOSPADM

## 2021-08-28 RX ORDER — BUPROPION HYDROCHLORIDE 150 MG/1
150 TABLET ORAL DAILY
COMMUNITY

## 2021-08-28 RX ORDER — AZITHROMYCIN 1 G/1
1 POWDER, FOR SUSPENSION ORAL
COMMUNITY
Start: 2021-08-25 | End: 2021-08-28

## 2021-08-28 RX ORDER — ASCORBIC ACID 500 MG
1000 TABLET ORAL 2 TIMES DAILY
Status: DISCONTINUED | OUTPATIENT
Start: 2021-08-28 | End: 2021-08-31 | Stop reason: HOSPADM

## 2021-08-28 RX ORDER — BUPROPION HYDROCHLORIDE 150 MG/1
150 TABLET ORAL DAILY
Status: DISCONTINUED | OUTPATIENT
Start: 2021-08-28 | End: 2021-08-31 | Stop reason: HOSPADM

## 2021-08-28 RX ORDER — FLUOXETINE HYDROCHLORIDE 20 MG/1
20 CAPSULE ORAL DAILY
Status: DISCONTINUED | OUTPATIENT
Start: 2021-08-28 | End: 2021-08-31 | Stop reason: HOSPADM

## 2021-08-28 RX ORDER — ZINC SULFATE 50(220)MG
1 CAPSULE ORAL DAILY
Status: DISCONTINUED | OUTPATIENT
Start: 2021-08-29 | End: 2021-08-31 | Stop reason: HOSPADM

## 2021-08-28 RX ADMIN — GUAIFENESIN 100 MG: 200 SOLUTION ORAL at 03:30

## 2021-08-28 RX ADMIN — GUAIFENESIN 600 MG: 600 TABLET, EXTENDED RELEASE ORAL at 20:15

## 2021-08-28 RX ADMIN — IPRATROPIUM BROMIDE AND ALBUTEROL 1 PUFF: 20; 100 SPRAY, METERED RESPIRATORY (INHALATION) at 09:00

## 2021-08-28 RX ADMIN — DEXAMETHASONE SODIUM PHOSPHATE 6 MG: 10 INJECTION, SOLUTION INTRAMUSCULAR; INTRAVENOUS at 09:34

## 2021-08-28 RX ADMIN — GUAIFENESIN 600 MG: 600 TABLET, EXTENDED RELEASE ORAL at 13:03

## 2021-08-28 RX ADMIN — OXYCODONE HYDROCHLORIDE AND ACETAMINOPHEN 1000 MG: 500 TABLET ORAL at 19:23

## 2021-08-28 RX ADMIN — BUPROPION HYDROCHLORIDE 150 MG: 150 TABLET, EXTENDED RELEASE ORAL at 13:05

## 2021-08-28 RX ADMIN — IPRATROPIUM BROMIDE AND ALBUTEROL 1 PUFF: 20; 100 SPRAY, METERED RESPIRATORY (INHALATION) at 20:13

## 2021-08-28 RX ADMIN — GUAIFENESIN 100 MG: 200 SOLUTION ORAL at 21:32

## 2021-08-28 RX ADMIN — IPRATROPIUM BROMIDE AND ALBUTEROL 1 PUFF: 20; 100 SPRAY, METERED RESPIRATORY (INHALATION) at 15:22

## 2021-08-28 RX ADMIN — ACETAMINOPHEN 650 MG: 325 TABLET ORAL at 03:56

## 2021-08-28 RX ADMIN — BENZONATATE 100 MG: 100 CAPSULE ORAL at 13:03

## 2021-08-28 RX ADMIN — IPRATROPIUM BROMIDE AND ALBUTEROL 1 PUFF: 20; 100 SPRAY, METERED RESPIRATORY (INHALATION) at 11:16

## 2021-08-28 RX ADMIN — OXYCODONE HYDROCHLORIDE AND ACETAMINOPHEN 1000 MG: 500 TABLET ORAL at 13:03

## 2021-08-28 RX ADMIN — IPRATROPIUM BROMIDE AND ALBUTEROL 1 PUFF: 20; 100 SPRAY, METERED RESPIRATORY (INHALATION) at 04:16

## 2021-08-28 RX ADMIN — BENZONATATE 100 MG: 100 CAPSULE ORAL at 01:43

## 2021-08-28 RX ADMIN — FLUOXETINE 20 MG: 20 CAPSULE ORAL at 13:05

## 2021-08-28 RX ADMIN — FAMOTIDINE 20 MG: 20 TABLET ORAL at 08:49

## 2021-08-28 RX ADMIN — IPRATROPIUM BROMIDE AND ALBUTEROL 1 PUFF: 20; 100 SPRAY, METERED RESPIRATORY (INHALATION) at 01:44

## 2021-08-28 NOTE — ED NOTES
bp reading delete bad data of the 82/55 ; due to cuff rolled down arm and pt position is side lying to left   RT in with pt at this time

## 2021-08-28 NOTE — ED NOTES
Page to Dr. Sammie Serrano re: pt med rec done for her home medications.  Pt takes home prozac and home wellbutrin as well as loestrin birth control; she also has been taking Z Pack prescribed and started WEdnesday this week; will notify the doctor

## 2021-08-28 NOTE — ED NOTES
Set up to use the [facility]; she used it and had a elevated HR 130s on monitor when up; pt taking po water well

## 2021-08-28 NOTE — ED NOTES
Increase the NC oxygen to 4lpm as pt sats to 86-87%; pt just finished eating; pt sats went up to 90-92% with the 4lpm change;

## 2021-08-28 NOTE — ED NOTES
Pt got up to bedside commode to void; pt oxygen readings Sats when she gets up and then back to bed. ; O2 sats on 2lpm nasal cannula 85-86% just after getting back to bed   Increase to 3 lpm readings go up to 88%  Increase to 4lpm

## 2021-08-28 NOTE — ED NOTES
Bedside shift change report given to Delmar Arias RN (oncoming nurse) by Melissa Hughes RN (offgoing nurse). Report included the following information SBAR, Kardex, ED Summary, [facility] and Recent Results. 7:28 AM: Bedside shift change report given to Navneet Morrison RN (oncoming nurse) by Delmar Arias RN (offgoing nurse). Report included the following information SBAR, Kardex, ED Summary, [facility] and Recent Results.

## 2021-08-28 NOTE — PROGRESS NOTES
Comprehensive Nutrition Assessment    Type and Reason for Visit: Initial, Positive nutrition screen    Nutrition Recommendations/Plan:   Continue regular diet  Ensure high protein BID  Please document % meals and supplements consumed in flowsheet I/O's under intake     Please replete low phos (1.7)    Nutrition Assessment:      MST triggered RD chart review. Pt noted for COVID-19. PMHx includes anxiety. MST indicates wt loss of 2-13lb and decreased appetite PTA. No wt hx in the EMR to review. Pt remains in the ED at this time and RD unable to visit pt at bedside d/t covid isolation. Oral nutrition supplement has been added to the diet order. Will continue monitoring. Wt Readings from Last 5 Encounters:   08/27/21 87.1 kg (192 lb 0.3 oz)   ]    Malnutrition Assessment:  Malnutrition Status:     TBD. Estimated Daily Nutrient Needs:  Energy (kcal): 1550 kcals (BMR x 1. 3AF - 300); Weight Used for Energy Requirements: Current  Protein (g): 70g (0.8g/kg); Weight Used for Protein Requirements: Current  Fluid (ml/day): 1500mL; Method Used for Fluid Requirements: 1 ml/kcal      Nutrition Related Findings:  Labs: phos 1.7. Meds: decadron. BM PTA. Wounds:    None       Current Nutrition Therapies:  ADULT DIET Regular  ADULT ORAL NUTRITION SUPPLEMENT Breakfast, Dinner; Low Calorie/High Protein    Anthropometric Measures:  · Height:  5' 1\" (154.9 cm)  · Current Body Wt:  87.1 kg (192 lb 0.3 oz)   · Ideal Body Wt:  105 lbs:  182.9 %   · BMI Category:  Obese class 2 (BMI 35.0-39. 9)       Nutrition Diagnosis:   · Inadequate protein-energy intake related to  (decreased appetite) as evidenced by poor intake prior to admission, weight loss (per MST)      Nutrition Interventions:   Food and/or Nutrient Delivery: Continue current diet, Continue oral nutrition supplement  Nutrition Education and Counseling: No recommendations at this time  Coordination of Nutrition Care: Continue to monitor while inpatient    Goals:  PO intake >50% meals and ONS next 3-5 days       Nutrition Monitoring and Evaluation:   Behavioral-Environmental Outcomes: None identified  Food/Nutrient Intake Outcomes: Food and nutrient intake, Supplement intake  Physical Signs/Symptoms Outcomes: Biochemical data, GI status, Weight    Discharge Planning:    Continue current diet     Electronically signed by Sade Brooks RD on 8/28/2021 at 12:51 PM    Contact: [phone]

## 2021-08-28 NOTE — PROGRESS NOTES
Hospitalist Progress Note    NAME: Mateusz Schuler   :  1970   MRN:  [id number]       Assessment / Plan:  Acute hypoxic respiratory failure, secondary to COVID-19 PNA POA   Continue droplet plus isolation  And breathing treatments. Continue Decadron 6 mg daily  Pharmacy waiting for CP results to keep Actemra. Continue all other supportive cares including oxygen. Chest x-ray showed bilateral interstitial airspace infiltrates which is consistent with Covid pneumonia. Mucinex  Vitamin C   Zinc  Pepcid 20 mg daily. Follow-up procalcitonin if elevated will start empiric coverage for bacterial pneumonia. Hypokalemia  corrected      Code Status: Full  Surrogate Decision Maker: Spouse  DVT Prophylaxis: Lovenox  GI Prophylaxis: not indicated  Baseline: Independent      Body mass index is 36.28 kg/m².: 30.0 - 39.9 Obese         Subjective:     Chief Complaint / Reason for Physician Visit  Pt  was seen and examined while she is in the ER. Not in distress. No chest pain shortness of breath she said is a little bit better. \". Discussed with RN events overnight. Review of Systems:  Symptom Y/N Comments  Symptom Y/N Comments   Fever/Chills n   Chest Pain n    Poor Appetite    Edema     Cough y   Abdominal Pain n    Sputum    Joint Pain     SOB/PATEL y   Pruritis/Rash     Nausea/vomit    Tolerating PT/OT     Diarrhea    Tolerating Diet     Constipation    Other       Could NOT obtain due to:      Objective:     VITALS:   Last 24hrs VS reviewed since prior progress note.  Most recent are:  Patient Vitals for the past 24 hrs:   Temp Pulse Resp BP SpO2   21 0733 98.5 °F (36.9 °C) 70 28 125/76 95 %   21 0635  69 27  94 %   21 0620  81 22  92 %   21 0610  74 30  92 %   21 0600  73 29 114/70 93 %   21 0530  74 29  94 %   21 0500  76 (!) 33  97 %   21 0430  73 (!) 32  94 %   21 0416 99.4 °F (37.4 °C) 78 24 118/72 95 %   21 0405  81 29  (!) 89 %   08/28/21 0400 99.2 °F (37.3 °C) 82 (!) 33 119/74 (!) 88 %   08/28/21 0349     (!) 89 %   08/28/21 0345  91 (!) 31  (!) 87 %   08/28/21 0330  89 (!) 32  (!) 88 %   08/28/21 0200  100 (!) 32 94/76 92 %   08/28/21 0049 99.2 °F (37.3 °C) 85 20 126/83 93 %   08/27/21 2059 99.2 °F (37.3 °C) 85 27 124/67 92 %   08/27/21 2049  83 28  93 %   08/27/21 1900  79 27 116/62 91 %   08/27/21 1800  84 28 115/69 93 %   08/27/21 1700 99 °F (37.2 °C) 85 28 123/72 92 %   08/27/21 1632     93 %   08/27/21 1630  89 18 129/77 93 %   08/27/21 1601  82 20 119/74 95 %   08/27/21 1522  84 25  94 %   08/27/21 1520    113/79    08/27/21 1330  90 17 102/65 94 %   08/27/21 1314     94 %   08/27/21 1313     (!) 88 %   08/27/21 1240  92 29 125/76 92 %   08/27/21 1231 99.7 °F (37.6 °C) (!) 119 26 96/83 (!) 89 %       Intake/Output Summary (Last 24 hours) at 8/28/2021 0752  Last data filed at 8/28/2021 0735  Gross per 24 hour   Intake    Output 800 ml   Net -800 ml        PHYSICAL EXAM:  General: WD, WN. Alert, cooperative, no acute distress    EENT:  EOMI. Anicteric sclerae. MMM  Resp:  Decreased air entry in the lower lung fields bilateral posterior. No wheezing no crackles CV:  Regular  rhythm,  No edema  GI:  Soft, Non distended, Non tender.  +Bowel sounds  Neurologic:  Alert and oriented X 3, normal speech,   Psych:   Good insight. Not anxious nor agitated  Skin:  No rashes.   No jaundice    Reviewed most current lab test results and cultures  YES  Reviewed most current radiology test results   YES  Review and summation of old records today    NO  Reviewed patient's current orders and MAR    YES  PMH/ reviewed - no change compared to H&P  ________________________________________________________________________  Care Plan discussed with:    Comments   Patient y    Family      RN y    Care Manager     Consultant                        Multidiciplinary team rounds were held today with , nursing, pharmacist and clinical coordinator. Patient's plan of care was discussed; medications were reviewed and discharge planning was addressed. ________________________________________________________________________  Total NON critical care TIME: 35   Minutes    Total CRITICAL CARE TIME Spent:   Minutes non procedure based      Comments   >50% of visit spent in counseling and coordination of care     ________________________________________________________________________  Nette Frederick MD     Procedures: see electronic medical records for all procedures/Xrays and details which were not copied into this note but were reviewed prior to creation of Plan. LABS:  I reviewed today's most current labs and imaging studies. Pertinent labs include:  Recent Labs     08/28/21  0314 08/27/21  1308   WBC 4.6 7.7   HGB 11.6 12.0   HCT 35.1 36.6    197     Recent Labs     08/28/21  0314 08/27/21  1308    137   K 4.0 3.3*    104   CO2 24 26   * 94   BUN 9 8   CREA 0.62 0.76   CA 8.2* 8.3*   MG 2.0 2.1   PHOS 1.7*  --    ALB 2.6* 2.6*   TBILI 0.4 0.3   ALT 27 30   INR  --  0.9       Signed:  Nette Frederick MD

## 2021-08-28 NOTE — PROGRESS NOTES
Pulmonary, Critical Care, and Sleep Medicine    Follow UP. Patient Consult    Name: Sinan Simpson MRN: [id number]   : 1970 Hospital: Replaced by [facility]   Date: 2021        IMPRESSION:   · Acute Covid Pneumonia, unvaccinated. bilateral, Multilobar infiltrates. +test . · Covid related inflammation with CRP of 16.2. · Low procalcitonin  · Mild-Moderate Hypoxia on 4L when seen. · Hypokalemia better this am.   · MIld GERD. · AT moderate risk of decompensation. Needs to be monitored closely for worsening hypoxia. RECOMMENDATIONS:   · ON Decadron for 10 days  · For Actemra  · Adjust oxygen to keep pox over 90%. · Will follow along with you. Please call if any acute issues. Subjective:   Last 24 hrs:   Pt is still in ER. Moved to room 10. She feels that her appetite is a little better. No chest pain. Feels her breathing is a little better. Hard to get much sleep last pm.   No headaches. She feels that her taste and smell may be getting a bit better. Still has an intermittent coughing. Admission:   Cc: Shortness of breath    HPI:     This patient has been seen and evaluated at the request of Dr. Lani Montoya for above. Patient is a 46 y.o. female Who presents for evaluation of  Dyspnea. Tested positive for Covid at [facility]. Has had 12 days of symptoms. Has increased dyspnea on Exertion. Associated fevers, dyspnea, cough productive of brown sputum. Has associated diarrhea. NO abdominal pain or vomiting. Has pleuritic chest pain worse with coughing. Has acute worsening dyspnea. NO chest pain, no back pain. No past medical history on file. No past surgical history on file. Prior to Admission medications    Medication Sig Start Date End Date Taking? Authorizing Provider   FLUoxetine (PROzac) 20 mg capsule Take 20 mg by mouth daily. Yes Other, MD Rena   buPROPion XL (WELLBUTRIN XL) 150 mg tablet Take 150 mg by mouth daily.    Yes Other, MD Rena Allergies   Allergen Reactions    Erythromycin Rash      Social History     Tobacco Use    Smoking status: Not on file   Substance Use Topics    Alcohol use: Not on file      No family history on file.      Current Facility-Administered Medications   Medication Dose Route Frequency    dexamethasone (DECADRON) 10 mg/mL injection 6 mg  6 mg IntraVENous Q24H    ascorbic acid (vitamin C) (VITAMIN C) tablet 1,000 mg  1,000 mg Oral BID    [START ON 2021] zinc sulfate (ZINCATE) 50 mg zinc (220 mg) capsule 1 Capsule  1 Capsule Oral DAILY    guaiFENesin ER (MUCINEX) tablet 600 mg  600 mg Oral Q12H    buPROPion XL (WELLBUTRIN XL) tablet 150 mg  150 mg Oral DAILY    FLUoxetine (PROzac) capsule 20 mg  20 mg Oral DAILY    ipratropium-albuterol (COMBIVENT RESPIMAT) 20 mcg-100 mcg inhalation spray  1 Puff Inhalation Q4H RT    famotidine (PEPCID) tablet 20 mg  20 mg Oral DAILY       Review of Systems:  Constitutional: positive for fevers, chills, fatigue and malaise  Eyes: negative  Ears, nose, mouth, throat, and face: negative  Respiratory: positive for cough, pleurisy/chest pain or dyspnea on exertion  Cardiovascular: positive for chest pressure/discomfort, dyspnea, paroxysmal nocturnal dyspnea, lower extremity edema  Gastrointestinal: positive for reflux symptoms  Genitourinary:negative  Integument/breast: negative  Hematologic/lymphatic: negative  Musculoskeletal:negative  Neurological: negative  Behavioral/Psych: negative  Endocrine: negative  Allergic/Immunologic: negative    Objective:   Vital Signs:    Visit Vitals  /65   Pulse 67   Temp 98.4 °F (36.9 °C)   Resp 30   Ht 5' 1\" (1.549 m)   Wt 87.1 kg (192 lb 0.3 oz)   LMP 2021   SpO2 94%   Breastfeeding No   BMI 36.28 kg/m²       O2 Device: Nasal cannula   O2 Flow Rate (L/min): 4 l/min   Temp (24hrs), Av °F (37.2 °C), Min:98.4 °F (36.9 °C), Max:99.4 °F (37.4 °C)       Intake/Output:   Last shift:       0701 -  1900  In: -   Out: 800 [Urine:800]  Last 3 shifts: No intake/output data recorded. Intake/Output Summary (Last 24 hours) at 8/28/2021 1259  Last data filed at 8/28/2021 0735  Gross per 24 hour   Intake    Output 800 ml   Net -800 ml      Physical Exam:   General:  Alert, cooperative, no distress, appears stated age. On NC 4L  oxygen. Head:  Normocephalic, without obvious abnormality, atraumatic. Eyes:  Conjunctivae/corneas clear. PERRL, EOMs intact. Nose: Nares normal. Septum midline. Mucosa normal. No drainage or sinus tenderness. Throat: Lips, mucosa, and tongue normal. Teeth and gums normal.   Neck: Supple, symmetrical, trachea midline, no adenopathy, thyroid: no enlargment/tenderness/nodules, no carotid bruit and no JVD. Back:   Symmetric, no curvature. ROM normal.   Lungs:   Clear to auscultation bilaterally. Decreased Bs In bases. Seems comfortable. Chest wall:  No tenderness or deformity. Heart:  Regular rate and rhythm, S1, S2 normal, no murmur, click, rub or gallop. Abdomen:   Soft, non-tender. Bowel sounds normal. No masses,  No organomegaly. Obese   Extremities: Extremities normal, atraumatic, no cyanosis or edema. Pulses: 2+ and symmetric all extremities. Skin: Skin color, texture, turgor normal. No rashes or lesions   Lymph nodes: Cervical, supraclavicular, and axillary nodes normal.   Neurologic: Grossly nonfocal, motor is intact. Psych: Good mood, No overt depression or anxiety.       Data review:     Recent Results (from the past 24 hour(s))   CBC WITH AUTOMATED DIFF    Collection Time: 08/27/21  1:08 PM   Result Value Ref Range    WBC 7.7 3.6 - 11.0 K/uL    RBC 4.17 3.80 - 5.20 M/uL    HGB 12.0 11.5 - 16.0 g/dL    HCT 36.6 35.0 - 47.0 %    MCV 87.8 80.0 - 99.0 FL    MCH 28.8 26.0 - 34.0 PG    MCHC 32.8 30.0 - 36.5 g/dL    RDW 14.5 11.5 - 14.5 %    PLATELET 081 989 - 690 K/uL    MPV 10.4 8.9 - 12.9 FL    NRBC 0.0 0  WBC    ABSOLUTE NRBC 0.00 0.00 - 0.01 K/uL    NEUTROPHILS 83 (H) 32 - 75 %    LYMPHOCYTES 12 12 - 49 %    MONOCYTES 4 (L) 5 - 13 %    EOSINOPHILS 0 0 - 7 %    BASOPHILS 0 0 - 1 %    IMMATURE GRANULOCYTES 1 (H) 0.0 - 0.5 %    ABS. NEUTROPHILS 6.4 1.8 - 8.0 K/UL    ABS. LYMPHOCYTES 0.9 0.8 - 3.5 K/UL    ABS. MONOCYTES 0.3 0.0 - 1.0 K/UL    ABS. EOSINOPHILS 0.0 0.0 - 0.4 K/UL    ABS. BASOPHILS 0.0 0.0 - 0.1 K/UL    ABS. IMM. GRANS. 0.1 (H) 0.00 - 0.04 K/UL    DF AUTOMATED     METABOLIC PANEL, COMPREHENSIVE    Collection Time: 08/27/21  1:08 PM   Result Value Ref Range    Sodium 137 136 - 145 mmol/L    Potassium 3.3 (L) 3.5 - 5.1 mmol/L    Chloride 104 97 - 108 mmol/L    CO2 26 21 - 32 mmol/L    Anion gap 7 5 - 15 mmol/L    Glucose 94 65 - 100 mg/dL    BUN 8 6 - 20 MG/DL    Creatinine 0.76 0.55 - 1.02 MG/DL    BUN/Creatinine ratio 11 (L) 12 - 20      GFR est AA >60 >60 ml/min/1.73m2    GFR est non-AA >60 >60 ml/min/1.73m2    Calcium 8.3 (L) 8.5 - 10.1 MG/DL    Bilirubin, total 0.3 0.2 - 1.0 MG/DL    ALT (SGPT) 30 12 - 78 U/L    AST (SGOT) 22 15 - 37 U/L    Alk.  phosphatase 59 45 - 117 U/L    Protein, total 7.6 6.4 - 8.2 g/dL    Albumin 2.6 (L) 3.5 - 5.0 g/dL    Globulin 5.0 (H) 2.0 - 4.0 g/dL    A-G Ratio 0.5 (L) 1.1 - 2.2     MAGNESIUM    Collection Time: 08/27/21  1:08 PM   Result Value Ref Range    Magnesium 2.1 1.6 - 2.4 mg/dL   PROTHROMBIN TIME + INR    Collection Time: 08/27/21  1:08 PM   Result Value Ref Range    INR 0.9 0.9 - 1.1      Prothrombin time 9.9 9.0 - 11.1 sec   PTT    Collection Time: 08/27/21  1:08 PM   Result Value Ref Range    aPTT 28.5 22.1 - 31.0 sec    aPTT, therapeutic range     58.0 - 77.0 SECS   PROCALCITONIN    Collection Time: 08/27/21  1:08 PM   Result Value Ref Range    Procalcitonin <0.05 ng/mL   C REACTIVE PROTEIN, QT    Collection Time: 08/27/21  1:08 PM   Result Value Ref Range    C-Reactive protein 16.20 (H) 0.00 - 0.60 mg/dL   LD    Collection Time: 08/27/21  1:08 PM   Result Value Ref Range     81 - 246 U/L   D DIMER    Collection Time: 08/27/21  1:08 PM   Result Value Ref Range    D-dimer 1.00 (H) 0.00 - 0.65 mg/L FEU   TROPONIN I    Collection Time: 08/27/21  1:08 PM   Result Value Ref Range    Troponin-I, Qt. <0.05 <0.05 ng/mL   LACTIC ACID    Collection Time: 08/27/21  1:08 PM   Result Value Ref Range    Lactic acid 1.3 0.4 - 2.0 MMOL/L   COVID-19 RAPID TEST    Collection Time: 08/27/21  1:08 PM   Result Value Ref Range    Specimen source Nasopharyngeal      COVID-19 rapid test Detected (AA) NOTD     FIBRINOGEN    Collection Time: 08/27/21  1:08 PM   Result Value Ref Range    Fibrinogen 762 (H) 200 - 475 mg/dL   FERRITIN    Collection Time: 08/27/21  1:08 PM   Result Value Ref Range    Ferritin 144 26 - 388 NG/ML   FERRITIN    Collection Time: 08/28/21  3:14 AM   Result Value Ref Range    Ferritin 141 8 - 252 NG/ML   CBC WITH AUTOMATED DIFF    Collection Time: 08/28/21  3:14 AM   Result Value Ref Range    WBC 4.6 3.6 - 11.0 K/uL    RBC 4.04 3.80 - 5.20 M/uL    HGB 11.6 11.5 - 16.0 g/dL    HCT 35.1 35.0 - 47.0 %    MCV 86.9 80.0 - 99.0 FL    MCH 28.7 26.0 - 34.0 PG    MCHC 33.0 30.0 - 36.5 g/dL    RDW 14.4 11.5 - 14.5 %    PLATELET 087 714 - 436 K/uL    MPV 10.2 8.9 - 12.9 FL    NRBC 0.0 0  WBC    ABSOLUTE NRBC 0.00 0.00 - 0.01 K/uL    NEUTROPHILS 81 (H) 32 - 75 %    LYMPHOCYTES 8 (L) 12 - 49 %    MONOCYTES 10 5 - 13 %    EOSINOPHILS 0 0 - 7 %    BASOPHILS 0 0 - 1 %    MYELOCYTES 1 %    IMMATURE GRANULOCYTES 0 0.0 - 0.5 %    ABS. NEUTROPHILS 3.7 1.8 - 8.0 K/UL    ABS. LYMPHOCYTES 0.4 (L) 0.8 - 3.5 K/UL    ABS. MONOCYTES 0.5 0.0 - 1.0 K/UL    ABS. EOSINOPHILS 0.0 0.0 - 0.4 K/UL    ABS. BASOPHILS 0.0 0.0 - 0.1 K/UL    ABS. IMM.  GRANS. 0.0 0.00 - 0.04 K/UL    DF MANUAL      PLATELET COMMENTS Large Platelets      RBC COMMENTS NORMOCYTIC, NORMOCHROMIC      WBC COMMENTS REACTIVE LYMPHS     METABOLIC PANEL, COMPREHENSIVE    Collection Time: 08/28/21  3:14 AM   Result Value Ref Range    Sodium 137 136 - 145 mmol/L    Potassium 4.0 3.5 - 5.1 mmol/L    Chloride 105 97 - 108 mmol/L    CO2 24 21 - 32 mmol/L    Anion gap 8 5 - 15 mmol/L    Glucose 131 (H) 65 - 100 mg/dL    BUN 9 6 - 20 MG/DL    Creatinine 0.62 0.55 - 1.02 MG/DL    BUN/Creatinine ratio 15 12 - 20      GFR est AA >60 >60 ml/min/1.73m2    GFR est non-AA >60 >60 ml/min/1.73m2    Calcium 8.2 (L) 8.5 - 10.1 MG/DL    Bilirubin, total 0.4 0.2 - 1.0 MG/DL    ALT (SGPT) 27 12 - 78 U/L    AST (SGOT) 19 15 - 37 U/L    Alk. phosphatase 58 45 - 117 U/L    Protein, total 7.0 6.4 - 8.2 g/dL    Albumin 2.6 (L) 3.5 - 5.0 g/dL    Globulin 4.4 (H) 2.0 - 4.0 g/dL    A-G Ratio 0.6 (L) 1.1 - 2.2     MAGNESIUM    Collection Time: 08/28/21  3:14 AM   Result Value Ref Range    Magnesium 2.0 1.6 - 2.4 mg/dL   PHOSPHORUS    Collection Time: 08/28/21  3:14 AM   Result Value Ref Range    Phosphorus 1.7 (L) 2.6 - 4.7 MG/DL   PROCALCITONIN    Collection Time: 08/28/21  3:14 AM   Result Value Ref Range    Procalcitonin <0.05 ng/mL   NT-PRO BNP    Collection Time: 08/28/21  3:14 AM   Result Value Ref Range    NT pro-BNP 64 <125 PG/ML   C REACTIVE PROTEIN, QT    Collection Time: 08/28/21  3:14 AM   Result Value Ref Range    C-Reactive protein 12.90 (H) 0.00 - 0.60 mg/dL   CRP, HIGH SENSITIVITY    Collection Time: 08/28/21  3:14 AM   Result Value Ref Range    CRP, High sensitivity >9.5 mg/L       Imaging:  I have personally reviewed the patients radiographs and have reviewed the reports:  8-27-21: COMPARISON: None.     FINDINGS: A portable AP radiograph of the chest was obtained at 1309 hours. The  patient is on a cardiac monitor. Heart size is normal. The lungs demonstrate low  lung volumes. There are bilateral interstitial and airspace opacity in the mid  lower lung zones. This is consistent with Covid pneumonia. Osseous structures  are unremarkable.  No pleural effusions.     IMPRESSION  Bilateral interstitial and airspace infiltrates are consistent with Covid  pneumonia        Cheyanne Hernandez MD

## 2021-08-29 PROCEDURE — 74011250636 HC RX REV CODE- 250/636: Performed by: INTERNAL MEDICINE

## 2021-08-29 PROCEDURE — 74011250637 HC RX REV CODE- 250/637: Performed by: GENERAL ACUTE CARE HOSPITAL

## 2021-08-29 PROCEDURE — 94640 AIRWAY INHALATION TREATMENT: CPT

## 2021-08-29 PROCEDURE — 74011250637 HC RX REV CODE- 250/637: Performed by: INTERNAL MEDICINE

## 2021-08-29 PROCEDURE — 65270000029 HC RM PRIVATE

## 2021-08-29 PROCEDURE — 77010033678 HC OXYGEN DAILY

## 2021-08-29 PROCEDURE — 74011250637 HC RX REV CODE- 250/637: Performed by: STUDENT IN AN ORGANIZED HEALTH CARE EDUCATION/TRAINING PROGRAM

## 2021-08-29 RX ORDER — DEXAMETHASONE SODIUM PHOSPHATE 4 MG/ML
6 INJECTION, SOLUTION INTRA-ARTICULAR; INTRALESIONAL; INTRAMUSCULAR; INTRAVENOUS; SOFT TISSUE EVERY 24 HOURS
Status: DISCONTINUED | OUTPATIENT
Start: 2021-08-29 | End: 2021-08-31 | Stop reason: HOSPADM

## 2021-08-29 RX ADMIN — DEXAMETHASONE SODIUM PHOSPHATE 6 MG: 4 INJECTION, SOLUTION INTRAMUSCULAR; INTRAVENOUS at 08:10

## 2021-08-29 RX ADMIN — ACETAMINOPHEN 650 MG: 325 TABLET ORAL at 09:35

## 2021-08-29 RX ADMIN — IPRATROPIUM BROMIDE AND ALBUTEROL 1 PUFF: 20; 100 SPRAY, METERED RESPIRATORY (INHALATION) at 08:46

## 2021-08-29 RX ADMIN — BUPROPION HYDROCHLORIDE 150 MG: 150 TABLET, EXTENDED RELEASE ORAL at 09:36

## 2021-08-29 RX ADMIN — OXYCODONE HYDROCHLORIDE AND ACETAMINOPHEN 1000 MG: 500 TABLET ORAL at 08:14

## 2021-08-29 RX ADMIN — IPRATROPIUM BROMIDE AND ALBUTEROL 1 PUFF: 20; 100 SPRAY, METERED RESPIRATORY (INHALATION) at 03:42

## 2021-08-29 RX ADMIN — IPRATROPIUM BROMIDE AND ALBUTEROL 1 PUFF: 20; 100 SPRAY, METERED RESPIRATORY (INHALATION) at 00:02

## 2021-08-29 RX ADMIN — IPRATROPIUM BROMIDE AND ALBUTEROL 1 PUFF: 20; 100 SPRAY, METERED RESPIRATORY (INHALATION) at 11:28

## 2021-08-29 RX ADMIN — FAMOTIDINE 20 MG: 20 TABLET ORAL at 08:14

## 2021-08-29 RX ADMIN — ZINC SULFATE 220 MG (50 MG) CAPSULE 1 CAPSULE: CAPSULE at 08:14

## 2021-08-29 RX ADMIN — GUAIFENESIN 600 MG: 600 TABLET, EXTENDED RELEASE ORAL at 20:44

## 2021-08-29 RX ADMIN — IPRATROPIUM BROMIDE AND ALBUTEROL 1 PUFF: 20; 100 SPRAY, METERED RESPIRATORY (INHALATION) at 20:33

## 2021-08-29 RX ADMIN — GUAIFENESIN 600 MG: 600 TABLET, EXTENDED RELEASE ORAL at 08:15

## 2021-08-29 RX ADMIN — OXYCODONE HYDROCHLORIDE AND ACETAMINOPHEN 1000 MG: 500 TABLET ORAL at 19:03

## 2021-08-29 RX ADMIN — FLUOXETINE 20 MG: 20 CAPSULE ORAL at 09:36

## 2021-08-29 RX ADMIN — IPRATROPIUM BROMIDE AND ALBUTEROL 1 PUFF: 20; 100 SPRAY, METERED RESPIRATORY (INHALATION) at 15:18

## 2021-08-29 RX ADMIN — BENZONATATE 100 MG: 100 CAPSULE ORAL at 12:37

## 2021-08-29 NOTE — PROGRESS NOTES
Hospitalist Progress Note    NAME: Donnise Mortimer   :  1970   MRN:  [id number]       Assessment / Plan:  Acute hypoxic respiratory failure, secondary to COVID-19 PNA POA   Continue droplet plus isolation  And breathing treatments. Continue Decadron 6 mg daily  Pharmacy consulted for Actemra  Continue all other supportive cares including oxygen. Chest x-ray showed bilateral interstitial airspace infiltrates which is consistent with Covid pneumonia. Continue Mucinex  Cont Vitamin C   Continue zinc  Pepcid 20 mg daily. Follow-up procalcitonin if elevated will start empiric coverage for bacterial pneumonia. Pulmonary help appreciated  Hypokalemia  corrected      Code Status: Full  Surrogate Decision Maker: Spouse  DVT Prophylaxis: Lovenox  GI Prophylaxis: not indicated  Baseline: Independent      Body mass index is 36.28 kg/m².: 30.0 - 39.9 Obese         Subjective:     Chief Complaint / Reason for Physician Visit  And was seen and examined while she was in the ER. Listen to his chest.  Still having mild shortness of breath and cough. No chest pain fever or chills. Discussed with RN events overnight. Review of Systems:  Symptom Y/N Comments  Symptom Y/N Comments   Fever/Chills n   Chest Pain n    Poor Appetite    Edema     Cough y   Abdominal Pain n    Sputum    Joint Pain     SOB/PATEL y   Pruritis/Rash     Nausea/vomit    Tolerating PT/OT     Diarrhea    Tolerating Diet     Constipation    Other       Could NOT obtain due to:      Objective:     VITALS:   Last 24hrs VS reviewed since prior progress note.  Most recent are:  Patient Vitals for the past 24 hrs:   Temp Pulse Resp BP SpO2   21 0725 98.4 °F (36.9 °C) (!) 59 20 113/70 98 %   21 0600 98.5 °F (36.9 °C) (!) 59 21 121/63 96 %   21 0513 98.4 °F (36.9 °C) 67 22 127/64 96 %   21 0351  71 18 124/65 94 %   21 0200  67 30 132/72 94 %   21 0003 98.6 °F (37 °C) 70 20 123/66 97 %   21 2200  74 22 121/63 95 %   08/28/21 2000  74 30 118/73 93 %   08/28/21 1928 97.8 °F (36.6 °C) 71 20 127/72 92 %   08/28/21 1800 99.4 °F (37.4 °C) 69 22 127/72 95 %   08/28/21 1538  75 25  92 %   08/28/21 1537  75 14  92 %   08/28/21 1536  73 10  93 %   08/28/21 1535  74 11  93 %   08/28/21 1522     96 %   08/28/21 1500     98 %   08/28/21 1400 98.3 °F (36.8 °C) 98 24 121/65 92 %   08/28/21 1200 98.4 °F (36.9 °C) 67 30 117/65 94 %   08/28/21 1116     91 %   08/28/21 1100  67 27 (!) 111/56 96 %   08/28/21 1000  75 20 121/65 90 %   08/28/21 0926  80 30 100/71 (!) 88 %   08/28/21 0900     93 %     No intake or output data in the 24 hours ending 08/29/21 0817     PHYSICAL EXAM:  General: WD, WN. Alert, cooperative, no acute distress    EENT:  EOMI. Anicteric sclerae. MMM  Resp:  Decreased air entry in the lower lung fields bilateral posterior. No wheezing no crackles CV:  Regular  rhythm,  No edema  GI:  Soft, Non distended, Non tender.  +Bowel sounds  Neurologic:  Alert and oriented X 3, normal speech,   Psych:   Good insight. Not anxious nor agitated  Skin:  No rashes. No jaundice    Reviewed most current lab test results and cultures  YES  Reviewed most current radiology test results   YES  Review and summation of old records today    NO  Reviewed patient's current orders and MAR    YES  PMH/SH reviewed - no change compared to H&P  ________________________________________________________________________  Care Plan discussed with:    Comments   Patient y    Family      RN y    Care Manager     Consultant                        Multidiciplinary team rounds were held today with , nursing, pharmacist and clinical coordinator. Patient's plan of care was discussed; medications were reviewed and discharge planning was addressed.      ________________________________________________________________________  Total NON critical care TIME: 35   Minutes    Total CRITICAL CARE TIME Spent:   Minutes non procedure based      Comments   >50% of visit spent in counseling and coordination of care     ________________________________________________________________________  Ivon Stuart MD     Procedures: see electronic medical records for all procedures/Xrays and details which were not copied into this note but were reviewed prior to creation of Plan. LABS:  I reviewed today's most current labs and imaging studies. Pertinent labs include:  Recent Labs     08/28/21  0314 08/27/21  1308   WBC 4.6 7.7   HGB 11.6 12.0   HCT 35.1 36.6    197     Recent Labs     08/28/21  0314 08/27/21  1308    137   K 4.0 3.3*    104   CO2 24 26   * 94   BUN 9 8   CREA 0.62 0.76   CA 8.2* 8.3*   MG 2.0 2.1   PHOS 1.7*  --    ALB 2.6* 2.6*   TBILI 0.4 0.3   ALT 27 30   INR  --  0.9       Signed:  Ivon Stuart MD

## 2021-08-29 NOTE — PROGRESS NOTES
Pulmonary, Critical Care, and Sleep Medicine    Follow UP. Patient Consult    Name: Iglesia Camejo MRN: [id number]   : 1970 Hospital: [facility]   Date: 2021        IMPRESSION:   · Has been in ER for 3 days! · Acute Covid Pneumonia, unvaccinated. bilateral, Multilobar infiltrates. +test . · Covid related inflammation with CRP of 16. 2. had Actemra  · Low procalcitonin  · Mild-Moderate Hypoxia on 4L when seen on , now on 1L with pox of 96%. So seems to be improving. · Hypokalemia resolved. · MIld GERD. · Overall seems to be stable from oxygenation and breathing standpoint. Over next 24 hrs if continues to improve, possible she could be discharged. RECOMMENDATIONS:   · ON Decadron for 10 days  · Adjust oxygen to keep pox over 90%. · Will follow along with you. Please call if any acute issues. Subjective:   Last 24 hrs:   Pt has been feeling pretty good. Still with some fatigue. No chest pain, no back pain. Has a cough of white sputum. NO headaches. Tolerating food and drink pretty well.     21:   Pt is still in ER. Moved to room 10. She feels that her appetite is a little better. No chest pain. Feels her breathing is a little better. Hard to get much sleep last pm.   No headaches. She feels that her taste and smell may be getting a bit better. Still has an intermittent coughing. Admission:   Cc: Shortness of breath    HPI:     This patient has been seen and evaluated at the request of Dr. Sadaf Hernandez for above. Patient is a 46 y.o. female Who presents for evaluation of  Dyspnea. Tested positive for Covid at [facility]. Has had 12 days of symptoms. Has increased dyspnea on Exertion. Associated fevers, dyspnea, cough productive of brown sputum. Has associated diarrhea. NO abdominal pain or vomiting. Has pleuritic chest pain worse with coughing. Has acute worsening dyspnea. NO chest pain, no back pain.          No past medical history on file.   No past surgical history on file. Prior to Admission medications    Medication Sig Start Date End Date Taking? Authorizing Provider   FLUoxetine (PROzac) 20 mg capsule Take 20 mg by mouth daily. Yes Other, MD Rena   buPROPion XL (WELLBUTRIN XL) 150 mg tablet Take 150 mg by mouth daily. Yes Other, MD Rena     Allergies   Allergen Reactions    Erythromycin Rash      Social History     Tobacco Use    Smoking status: Not on file   Substance Use Topics    Alcohol use: Not on file      No family history on file.      Current Facility-Administered Medications   Medication Dose Route Frequency    dexamethasone (DECADRON) 4 mg/mL injection 6 mg  6 mg IntraVENous Q24H    ascorbic acid (vitamin C) (VITAMIN C) tablet 1,000 mg  1,000 mg Oral BID    zinc sulfate (ZINCATE) 50 mg zinc (220 mg) capsule 1 Capsule  1 Capsule Oral DAILY    guaiFENesin ER (MUCINEX) tablet 600 mg  600 mg Oral Q12H    buPROPion XL (WELLBUTRIN XL) tablet 150 mg  150 mg Oral DAILY    FLUoxetine (PROzac) capsule 20 mg  20 mg Oral DAILY    ipratropium-albuterol (COMBIVENT RESPIMAT) 20 mcg-100 mcg inhalation spray  1 Puff Inhalation Q4H RT    famotidine (PEPCID) tablet 20 mg  20 mg Oral DAILY       Review of Systems:  Constitutional: positive for fevers, chills, fatigue and malaise  Eyes: negative  Ears, nose, mouth, throat, and face: negative  Respiratory: positive for cough, pleurisy/chest pain or dyspnea on exertion  Cardiovascular: positive for chest pressure/discomfort, dyspnea, paroxysmal nocturnal dyspnea, lower extremity edema  Gastrointestinal: positive for reflux symptoms  Genitourinary:negative  Integument/breast: negative  Hematologic/lymphatic: negative  Musculoskeletal:negative  Neurological: negative  Behavioral/Psych: negative  Endocrine: negative  Allergic/Immunologic: negative    Objective:   Vital Signs:    Visit Vitals  BP (!) 117/54   Pulse 79   Temp 98.2 °F (36.8 °C)   Resp 20   Ht 5' 1\" (1.549 m)   Wt 87.1 kg (192 lb 0.3 oz)   LMP 2021   SpO2 96%   Breastfeeding No   BMI 36.28 kg/m²       O2 Device: Nasal cannula   O2 Flow Rate (L/min): 1 l/min   Temp (24hrs), Av.5 °F (36.9 °C), Min:97.8 °F (36.6 °C), Max:99.4 °F (37.4 °C)       Intake/Output:   Last shift:      No intake/output data recorded. Last 3 shifts:  190 -  0700  In: -   Out: 800 [Urine:800]  No intake or output data in the 24 hours ending 21 1237   Physical Exam:   General:  Alert, cooperative, no distress, appears stated age. On NC 1L  Oxygen with pox of 96%. Head:  Normocephalic, without obvious abnormality, atraumatic. Eyes:  Conjunctivae/corneas clear. PERRL, EOMs intact. Nose: Nares normal. Septum midline. Mucosa normal. No drainage or sinus tenderness. Throat: Lips, mucosa, and tongue normal. Teeth and gums normal.   Neck: Supple, symmetrical, trachea midline, no adenopathy, thyroid: no enlargment/tenderness/nodules, no carotid bruit and no JVD. Back:   Symmetric, no curvature. ROM normal.   Lungs:   Clear to auscultation bilaterally. Decreased Bs In bases. Seems comfortable. On stretcher in ER. Chest wall:  No tenderness or deformity. Heart:  Regular rate and rhythm, S1, S2 normal, no murmur, click, rub or gallop. Abdomen:   Soft, non-tender. Bowel sounds normal. No masses,  No organomegaly. Obese   Extremities: Extremities normal, atraumatic, no cyanosis or edema. Pulses: 2+ and symmetric all extremities. Skin: Skin color, texture, turgor normal. No rashes or lesions   Lymph nodes: Cervical, supraclavicular, and axillary nodes normal.   Neurologic: Grossly nonfocal, motor is intact. Psych: Good mood, No overt depression or anxiety. Data review:     No results found for this or any previous visit (from the past 24 hour(s)).     Imaging:  I have personally reviewed the patients radiographs and have reviewed the reports:  21: COMPARISON: None.     FINDINGS: A portable AP radiograph of the chest was obtained at 1309 hours. The  patient is on a cardiac monitor. Heart size is normal. The lungs demonstrate low  lung volumes. There are bilateral interstitial and airspace opacity in the mid  lower lung zones. This is consistent with Covid pneumonia. Osseous structures  are unremarkable.  No pleural effusions.     IMPRESSION  Bilateral interstitial and airspace infiltrates are consistent with Covid  pneumonia        Suzette Hastings MD

## 2021-08-30 ENCOUNTER — APPOINTMENT (OUTPATIENT)
Dept: GENERAL RADIOLOGY | Age: 51
DRG: 177 | End: 2021-08-30
Attending: INTERNAL MEDICINE
Payer: COMMERCIAL

## 2021-08-30 LAB
ALBUMIN SERPL-MCNC: 2.6 G/DL (ref 3.5–5)
ALBUMIN/GLOB SERPL: 0.6 {RATIO} (ref 1.1–2.2)
ALP SERPL-CCNC: 57 U/L (ref 45–117)
ALT SERPL-CCNC: 39 U/L (ref 12–78)
ANION GAP SERPL CALC-SCNC: 4 MMOL/L (ref 5–15)
AST SERPL-CCNC: 23 U/L (ref 15–37)
BASOPHILS # BLD: 0 K/UL (ref 0–0.1)
BASOPHILS NFR BLD: 0 % (ref 0–1)
BILIRUB SERPL-MCNC: 0.4 MG/DL (ref 0.2–1)
BUN SERPL-MCNC: 15 MG/DL (ref 6–20)
BUN/CREAT SERPL: 22 (ref 12–20)
CALCIUM SERPL-MCNC: 8.9 MG/DL (ref 8.5–10.1)
CHLORIDE SERPL-SCNC: 106 MMOL/L (ref 97–108)
CO2 SERPL-SCNC: 27 MMOL/L (ref 21–32)
CREAT SERPL-MCNC: 0.68 MG/DL (ref 0.55–1.02)
CRP SERPL HS-MCNC: >9.5 MG/L
DIFFERENTIAL METHOD BLD: NORMAL
EOSINOPHIL # BLD: 0 K/UL (ref 0–0.4)
EOSINOPHIL NFR BLD: 0 % (ref 0–7)
ERYTHROCYTE [DISTWIDTH] IN BLOOD BY AUTOMATED COUNT: 14.2 % (ref 11.5–14.5)
GLOBULIN SER CALC-MCNC: 4.7 G/DL (ref 2–4)
GLUCOSE SERPL-MCNC: 95 MG/DL (ref 65–100)
HCT VFR BLD AUTO: 35.5 % (ref 35–47)
HGB BLD-MCNC: 11.5 G/DL (ref 11.5–16)
IMM GRANULOCYTES # BLD AUTO: 0 K/UL (ref 0–0.04)
IMM GRANULOCYTES NFR BLD AUTO: 0 % (ref 0–0.5)
LYMPHOCYTES # BLD: 2.6 K/UL (ref 0.8–3.5)
LYMPHOCYTES NFR BLD: 28 % (ref 12–49)
MCH RBC QN AUTO: 28.5 PG (ref 26–34)
MCHC RBC AUTO-ENTMCNC: 32.4 G/DL (ref 30–36.5)
MCV RBC AUTO: 87.9 FL (ref 80–99)
MONOCYTES # BLD: 1 K/UL (ref 0–1)
MONOCYTES NFR BLD: 11 % (ref 5–13)
NEUTS SEG # BLD: 5.7 K/UL (ref 1.8–8)
NEUTS SEG NFR BLD: 61 % (ref 32–75)
NRBC # BLD: 0 K/UL (ref 0–0.01)
NRBC BLD-RTO: 0 PER 100 WBC
PLATELET # BLD AUTO: 351 K/UL (ref 150–400)
PMV BLD AUTO: 9.5 FL (ref 8.9–12.9)
POTASSIUM SERPL-SCNC: 4 MMOL/L (ref 3.5–5.1)
PROT SERPL-MCNC: 7.3 G/DL (ref 6.4–8.2)
RBC # BLD AUTO: 4.04 M/UL (ref 3.8–5.2)
RBC MORPH BLD: NORMAL
SODIUM SERPL-SCNC: 137 MMOL/L (ref 136–145)
WBC # BLD AUTO: 9.3 K/UL (ref 3.6–11)

## 2021-08-30 PROCEDURE — 80053 COMPREHEN METABOLIC PANEL: CPT

## 2021-08-30 PROCEDURE — 94640 AIRWAY INHALATION TREATMENT: CPT

## 2021-08-30 PROCEDURE — 85025 COMPLETE CBC W/AUTO DIFF WBC: CPT

## 2021-08-30 PROCEDURE — 65660000000 HC RM CCU STEPDOWN

## 2021-08-30 PROCEDURE — 74011250637 HC RX REV CODE- 250/637: Performed by: GENERAL ACUTE CARE HOSPITAL

## 2021-08-30 PROCEDURE — 74011250637 HC RX REV CODE- 250/637: Performed by: INTERNAL MEDICINE

## 2021-08-30 PROCEDURE — 36415 COLL VENOUS BLD VENIPUNCTURE: CPT

## 2021-08-30 PROCEDURE — 86141 C-REACTIVE PROTEIN HS: CPT

## 2021-08-30 PROCEDURE — 74011250636 HC RX REV CODE- 250/636: Performed by: INTERNAL MEDICINE

## 2021-08-30 PROCEDURE — 71045 X-RAY EXAM CHEST 1 VIEW: CPT

## 2021-08-30 RX ADMIN — BENZONATATE 100 MG: 100 CAPSULE ORAL at 05:05

## 2021-08-30 RX ADMIN — IPRATROPIUM BROMIDE AND ALBUTEROL 1 PUFF: 20; 100 SPRAY, METERED RESPIRATORY (INHALATION) at 08:30

## 2021-08-30 RX ADMIN — GUAIFENESIN 600 MG: 600 TABLET, EXTENDED RELEASE ORAL at 11:08

## 2021-08-30 RX ADMIN — OXYCODONE HYDROCHLORIDE AND ACETAMINOPHEN 1000 MG: 500 TABLET ORAL at 17:44

## 2021-08-30 RX ADMIN — DEXAMETHASONE SODIUM PHOSPHATE 6 MG: 4 INJECTION, SOLUTION INTRAMUSCULAR; INTRAVENOUS at 11:09

## 2021-08-30 RX ADMIN — FLUOXETINE 20 MG: 20 CAPSULE ORAL at 11:08

## 2021-08-30 RX ADMIN — GUAIFENESIN 600 MG: 600 TABLET, EXTENDED RELEASE ORAL at 21:20

## 2021-08-30 RX ADMIN — FAMOTIDINE 20 MG: 20 TABLET ORAL at 11:08

## 2021-08-30 RX ADMIN — IPRATROPIUM BROMIDE AND ALBUTEROL 1 PUFF: 20; 100 SPRAY, METERED RESPIRATORY (INHALATION) at 12:08

## 2021-08-30 RX ADMIN — IPRATROPIUM BROMIDE AND ALBUTEROL 1 PUFF: 20; 100 SPRAY, METERED RESPIRATORY (INHALATION) at 15:01

## 2021-08-30 RX ADMIN — IPRATROPIUM BROMIDE AND ALBUTEROL 1 PUFF: 20; 100 SPRAY, METERED RESPIRATORY (INHALATION) at 20:00

## 2021-08-30 RX ADMIN — IPRATROPIUM BROMIDE AND ALBUTEROL 1 PUFF: 20; 100 SPRAY, METERED RESPIRATORY (INHALATION) at 23:12

## 2021-08-30 RX ADMIN — ZINC SULFATE 220 MG (50 MG) CAPSULE 1 CAPSULE: CAPSULE at 11:08

## 2021-08-30 RX ADMIN — OXYCODONE HYDROCHLORIDE AND ACETAMINOPHEN 1000 MG: 500 TABLET ORAL at 11:08

## 2021-08-30 RX ADMIN — BUPROPION HYDROCHLORIDE 150 MG: 150 TABLET, EXTENDED RELEASE ORAL at 11:08

## 2021-08-30 NOTE — PROGRESS NOTES
Home Oxygen Test  Date of test: 08/30  Time of test: 1407    Sa02  94% on room air AT REST. Sa02 92% on room air DURING AMBULATION. Sa02 94% on 1.5 Liters DURING AMBULATION. Sa02 94% on 1.5 Liters AT REST/AFTER AMBULATION.

## 2021-08-30 NOTE — PROGRESS NOTES
Transition of Care Plan:    RUR: 9% low risk  Disposition: Home with family assistance  Follow up appointments: PCP  DME needed: No needs identified. Pt will need 02 challenge prior to d/c  Transportation at Discharge: Pt is unsure  Keys or means to access home:  Pt has keys      IM Medicare Letter: No  Is patient a BCPI-A Bundle:   No        If yes, was Bundle Letter given?:     Caregiver Contact: SisterWing Chu ([phone])  Discharge Caregiver contacted prior to discharge? CM will contact cg prior to d/c    Reason for Admission:  PNA due to COVID-19 virus                     RUR Score:          9%           Plan for utilizing home health:     No needs identified     PCP: First and Last name:  Tony Lopez DO     Name of Practice: [facility]   Are you a current patient: Yes/No: Yes   Approximate date of last visit: A couple months ago   Can you participate in a virtual visit with your PCP: Yes                    Current Advanced Directive/Advance Care Plan: Full Code      Healthcare Decision Maker: Kvng Minor ([phone])                  Transition of Care Plan:    Home with family assistance    CM contacted patient via bedside telephone to complete initial assessment. CM introduced role, verified demographics, and discussed discharge planning. Pt is a 47 yo female with an admitting diagnosis of PNA due to COVID-19 virus. Pt reports she has insurance and stated that registration did not ask for her insurance card. Pt uses [address] [place] [address] on [facility]. Pt reports that her currently address is [address]. Pt lives in an apartment with her son. Pt is independent at baseline. Pt drives and uses no home DME. Pt has no hx of HH, SNF, or IPR. Pt reports she does not know who will transport her home at this time.      CM will continue to follow for discharge planning while patient is admitted on current unit. Please contact this CM with questions or issues related to discharge. Care Management Interventions  PCP Verified by CM: Yes (Dr. Anthony Massey - last visit a couple months ago)  Mode of Transport at Discharge: Other (see comment) (Pt reports she is unsure who will transport her home)  Transition of Care Consult (CM Consult): Discharge Planning  Discharge Durable Medical Equipment: No  Occupational Therapy Consult: No  Speech Therapy Consult: No  Current Support Network: Other (Pt lives in an apartment with her son)  Confirm Follow Up Transport: Self  Discharge Location  Discharge Placement: Home with family assistance  ----------------------------  Aristeo Cosme (ACP) Conversation      Date of Conversation: 8/30/2021  Conducted with: Patient with Decision Making Capacity    Healthcare Decision Maker:   No healthcare decision makers have been documented. Click here to complete [address] including selection of the Healthcare Decision Maker Relationship (ie \"Primary\")    Today we documented Decision Maker(s) consistent with Legal Next of Kin hierarchy.     Content/Action Overview:   DECLINED ACP conversation - will revisit periodically   Reviewed DNR/DNI and patient elects Full Code (Attempt Resuscitation)    Length of Voluntary ACP Conversation in minutes:  <16 minutes (Non-Billable)    RAMILA Parker  Care Manager [facility]  [phone]

## 2021-08-30 NOTE — PROGRESS NOTES
Verbal shift change report given to Linda Bobby (oncoming nurse) by Salvatore Garcia (offgoing nurse). Report included the following information SBAR, Kardex, Intake/Output, MAR and Recent Results.

## 2021-08-30 NOTE — PROGRESS NOTES
Pulmonary, Critical Care, and Sleep Medicine    Name: Beth Markham MRN: [id number]   : 1970 Hospital: [facility]   Date: 2021        IMPRESSION:   · Acute hypoxic respiratory failure  · Acute Covid Pneumonia, unvaccinated. Bilateral, Multilobar infiltrates. +test . · Covid related inflammation with CRP of 16. 2. had Actemra  · Hypokalemia resolved. · Mild GERD. RECOMMENDATIONS:   · O2 - wean as tolerated  · Decadron for 10 days  · Outside of window for administration of remdesivir per hospital criteria  · DVT prophylaxis   · Clinically improving, down to 2 LPM.  Does not appear to require ongoing specialty consultation at this time. Will be available PRN. Please don't hesitate to call if her clinical situation worsens     Subjective:     21: feeling better overall   21:  Pt has been feeling pretty good. Still with some fatigue. No chest pain, no back pain. Has a cough of white sputum. NO headaches. Tolerating food and drink pretty well.     21:   Pt is still in ER. Moved to room 10. She feels that her appetite is a little better. No chest pain. Feels her breathing is a little better. Hard to get much sleep last pm.   No headaches. She feels that her taste and smell may be getting a bit better. Still has an intermittent coughing. Admission:   Cc: Shortness of breath    HPI:     This patient has been seen and evaluated at the request of Dr. Lulu Minor for above. Patient is a 46 y.o. female Who presents for evaluation of  Dyspnea. Tested positive for Covid at [facility]. Has had 12 days of symptoms. Has increased dyspnea on Exertion. Associated fevers, dyspnea, cough productive of brown sputum. Has associated diarrhea. NO abdominal pain or vomiting. Has pleuritic chest pain worse with coughing. Has acute worsening dyspnea. NO chest pain, no back pain. No past medical history on file. No past surgical history on file.    Prior to Admission medications    Medication Sig Start Date End Date Taking? Authorizing Provider   FLUoxetine (PROzac) 20 mg capsule Take 20 mg by mouth daily. Yes Other, MD Rena   buPROPion XL (WELLBUTRIN XL) 150 mg tablet Take 150 mg by mouth daily. Yes Other, MD Rena     Allergies   Allergen Reactions    Erythromycin Rash      Social History     Tobacco Use    Smoking status: Not on file   Substance Use Topics    Alcohol use: Not on file      No family history on file.      Current Facility-Administered Medications   Medication Dose Route Frequency    dexamethasone (DECADRON) 4 mg/mL injection 6 mg  6 mg IntraVENous Q24H    ascorbic acid (vitamin C) (VITAMIN C) tablet 1,000 mg  1,000 mg Oral BID    zinc sulfate (ZINCATE) 50 mg zinc (220 mg) capsule 1 Capsule  1 Capsule Oral DAILY    guaiFENesin ER (MUCINEX) tablet 600 mg  600 mg Oral Q12H    buPROPion XL (WELLBUTRIN XL) tablet 150 mg  150 mg Oral DAILY    FLUoxetine (PROzac) capsule 20 mg  20 mg Oral DAILY    ipratropium-albuterol (COMBIVENT RESPIMAT) 20 mcg-100 mcg inhalation spray  1 Puff Inhalation Q4H RT    famotidine (PEPCID) tablet 20 mg  20 mg Oral DAILY       Review of Systems:  Constitutional: positive for fevers, chills, fatigue and malaise  Eyes: negative  Ears, nose, mouth, throat, and face: negative  Respiratory: positive for cough, pleurisy/chest pain or dyspnea on exertion  Cardiovascular: positive for chest pressure/discomfort, dyspnea, paroxysmal nocturnal dyspnea, lower extremity edema  Gastrointestinal: positive for reflux symptoms  Genitourinary:negative  Integument/breast: negative  Hematologic/lymphatic: negative  Musculoskeletal:negative  Neurological: negative  Behavioral/Psych: negative  Endocrine: negative  Allergic/Immunologic: negative    Objective:   Vital Signs:    Visit Vitals  /61   Pulse 76   Temp 98.3 °F (36.8 °C)   Resp 20   Ht 5' 1\" (1.549 m)   Wt 87.1 kg (192 lb 0.3 oz)   LMP 07/28/2021   SpO2 95%   Breastfeeding No   BMI 36.28 kg/m²       O2 Device: Nasal cannula   O2 Flow Rate (L/min): 2 l/min   Temp (24hrs), Av.2 °F (36.8 °C), Min:97.7 °F (36.5 °C), Max:98.4 °F (36.9 °C)       Intake/Output:   Last shift:       0701 -  1900  In: -   Out: 425 [Urine:425]  Last 3 shifts: No intake/output data recorded. Intake/Output Summary (Last 24 hours) at 2021 1108  Last data filed at 2021 1052  Gross per 24 hour   Intake    Output 425 ml   Net -425 ml      Physical Exam:   General:  Alert, cooperative, no distress, appears stated age. On NC 1L  Oxygen with pox of 96%. Head:  Normocephalic, without obvious abnormality, atraumatic. Eyes:  Conjunctivae/corneas clear. PERRL, EOMs intact. Nose: Nares normal. Septum midline. Mucosa normal. No drainage or sinus tenderness. Throat: Lips, mucosa, and tongue normal. Teeth and gums normal.   Neck: Supple, symmetrical, trachea midline, no adenopathy, thyroid: no enlargment/tenderness/nodules, no carotid bruit and no JVD. Back:   Symmetric, no curvature. ROM normal.   Lungs:   Clear to auscultation bilaterally. Decreased Bs In bases. Seems comfortable. On stretcher in ER. Chest wall:  No tenderness or deformity. Heart:  Regular rate and rhythm, S1, S2 normal, no murmur, click, rub or gallop. Abdomen:   Soft, non-tender. Bowel sounds normal. No masses,  No organomegaly. Obese   Extremities: Extremities normal, atraumatic, no cyanosis or edema. Pulses: 2+ and symmetric all extremities. Skin: Skin color, texture, turgor normal. No rashes or lesions   Lymph nodes: Cervical, supraclavicular, and axillary nodes normal.   Neurologic: Grossly nonfocal, motor is intact. Psych: Good mood, No overt depression or anxiety.       Data review:     Recent Results (from the past 24 hour(s))   CBC WITH AUTOMATED DIFF    Collection Time: 21  4:49 AM   Result Value Ref Range    WBC 9.3 3.6 - 11.0 K/uL    RBC 4.04 3.80 - 5.20 M/uL    HGB 11.5 11.5 - 16.0 g/dL HCT 35.5 35.0 - 47.0 %    MCV 87.9 80.0 - 99.0 FL    MCH 28.5 26.0 - 34.0 PG    MCHC 32.4 30.0 - 36.5 g/dL    RDW 14.2 11.5 - 14.5 %    PLATELET 755 180 - 078 K/uL    MPV 9.5 8.9 - 12.9 FL    NRBC 0.0 0  WBC    ABSOLUTE NRBC 0.00 0.00 - 0.01 K/uL    NEUTROPHILS 61 32 - 75 %    LYMPHOCYTES 28 12 - 49 %    MONOCYTES 11 5 - 13 %    EOSINOPHILS 0 0 - 7 %    BASOPHILS 0 0 - 1 %    IMMATURE GRANULOCYTES 0 0.0 - 0.5 %    ABS. NEUTROPHILS 5.7 1.8 - 8.0 K/UL    ABS. LYMPHOCYTES 2.6 0.8 - 3.5 K/UL    ABS. MONOCYTES 1.0 0.0 - 1.0 K/UL    ABS. EOSINOPHILS 0.0 0.0 - 0.4 K/UL    ABS. BASOPHILS 0.0 0.0 - 0.1 K/UL    ABS. IMM. GRANS. 0.0 0.00 - 0.04 K/UL    DF MANUAL      RBC COMMENTS NORMOCYTIC, NORMOCHROMIC     METABOLIC PANEL, COMPREHENSIVE    Collection Time: 08/30/21  4:49 AM   Result Value Ref Range    Sodium 137 136 - 145 mmol/L    Potassium 4.0 3.5 - 5.1 mmol/L    Chloride 106 97 - 108 mmol/L    CO2 27 21 - 32 mmol/L    Anion gap 4 (L) 5 - 15 mmol/L    Glucose 95 65 - 100 mg/dL    BUN 15 6 - 20 MG/DL    Creatinine 0.68 0.55 - 1.02 MG/DL    BUN/Creatinine ratio 22 (H) 12 - 20      GFR est AA >60 >60 ml/min/1.73m2    GFR est non-AA >60 >60 ml/min/1.73m2    Calcium 8.9 8.5 - 10.1 MG/DL    Bilirubin, total 0.4 0.2 - 1.0 MG/DL    ALT (SGPT) 39 12 - 78 U/L    AST (SGOT) 23 15 - 37 U/L    Alk. phosphatase 57 45 - 117 U/L    Protein, total 7.3 6.4 - 8.2 g/dL    Albumin 2.6 (L) 3.5 - 5.0 g/dL    Globulin 4.7 (H) 2.0 - 4.0 g/dL    A-G Ratio 0.6 (L) 1.1 - 2.2     CRP, HIGH SENSITIVITY    Collection Time: 08/30/21  4:49 AM   Result Value Ref Range    CRP, High sensitivity >9.5 mg/L       Imaging:  I have personally reviewed the patients radiographs and have reviewed the reports:  8-27-21: COMPARISON: None.     FINDINGS: A portable AP radiograph of the chest was obtained at 1309 hours. The  patient is on a cardiac monitor. Heart size is normal. The lungs demonstrate low  lung volumes.  There are bilateral interstitial and airspace opacity in the mid  lower lung zones. This is consistent with Covid pneumonia. Osseous structures  are unremarkable.  No pleural effusions.     IMPRESSION  Bilateral interstitial and airspace infiltrates are consistent with Covid  pneumonia        Dominguez Nicole MD

## 2021-08-30 NOTE — PROGRESS NOTES
End of Shift Note    Bedside shift change report given to Papo (oncoming nurse) by Guerda Benedict (offgoing nurse). Report included the following information SBAR, Kardex and MAR    Shift worked:  7p-7a     Shift summary and any significant changes:     Patient was transferred from the ED. Patient did complain of a sporadic cough, Tessalon was given,, see PRN MAR. Patient is on 2L of O2. Patient is ad boone to the bathroom. Patient teaching and routine rounding as been done. Concerns for physician to address:       Zone phone for oncoming shift:          Activity:  Activity Level: Up ad boone  Number times ambulated in hallways past shift: 0  Number of times OOB to chair past shift: 0    Cardiac:   Cardiac Monitoring: Yes      Cardiac Rhythm: Sinus Rhythm    Access:   Current line(s): PIV     Genitourinary:   Urinary status: voiding    Respiratory:   O2 Device: Nasal cannula  Chronic home O2 use?: NO  Incentive spirometer at bedside: NO     GI:  Last Bowel Movement Date: 08/26/21  Current diet:  ADULT DIET Regular  ADULT ORAL NUTRITION SUPPLEMENT Breakfast, Dinner; Low Calorie/High Protein  Passing flatus: YES  Tolerating current diet: YES       Pain Management:   Patient states pain is manageable on current regimen: YES    Skin:  Saran Score: 20  Interventions: increase time out of bed    Patient Safety:  Fall Score:  Total Score: 1  Interventions: bed/chair alarm       Length of Stay:  Expected LOS: - - -  Actual LOS: 3      Guerda Benedict

## 2021-08-31 VITALS
SYSTOLIC BLOOD PRESSURE: 114 MMHG | HEART RATE: 66 BPM | DIASTOLIC BLOOD PRESSURE: 80 MMHG | HEIGHT: 61 IN | OXYGEN SATURATION: 92 % | TEMPERATURE: 98.5 F | BODY MASS INDEX: 36.25 KG/M2 | RESPIRATION RATE: 18 BRPM | WEIGHT: 192.02 LBS

## 2021-08-31 PROBLEM — J12.82 PNEUMONIA DUE TO COVID-19 VIRUS: Status: RESOLVED | Noted: 2021-08-27 | Resolved: 2021-08-31

## 2021-08-31 PROBLEM — U07.1 PNEUMONIA DUE TO COVID-19 VIRUS: Status: RESOLVED | Noted: 2021-08-27 | Resolved: 2021-08-31

## 2021-08-31 PROCEDURE — 74011250637 HC RX REV CODE- 250/637: Performed by: INTERNAL MEDICINE

## 2021-08-31 PROCEDURE — 74011250637 HC RX REV CODE- 250/637: Performed by: GENERAL ACUTE CARE HOSPITAL

## 2021-08-31 PROCEDURE — 94640 AIRWAY INHALATION TREATMENT: CPT

## 2021-08-31 PROCEDURE — 74011250636 HC RX REV CODE- 250/636: Performed by: INTERNAL MEDICINE

## 2021-08-31 PROCEDURE — 94760 N-INVAS EAR/PLS OXIMETRY 1: CPT

## 2021-08-31 RX ORDER — DEXAMETHASONE 6 MG/1
6 TABLET ORAL
Qty: 7 TABLET | Refills: 0 | Status: SHIPPED | OUTPATIENT
Start: 2021-08-31 | End: 2021-09-07

## 2021-08-31 RX ADMIN — FLUOXETINE 20 MG: 20 CAPSULE ORAL at 08:20

## 2021-08-31 RX ADMIN — BUPROPION HYDROCHLORIDE 150 MG: 150 TABLET, EXTENDED RELEASE ORAL at 08:20

## 2021-08-31 RX ADMIN — GUAIFENESIN 600 MG: 600 TABLET, EXTENDED RELEASE ORAL at 08:20

## 2021-08-31 RX ADMIN — FAMOTIDINE 20 MG: 20 TABLET ORAL at 08:20

## 2021-08-31 RX ADMIN — IPRATROPIUM BROMIDE AND ALBUTEROL 1 PUFF: 20; 100 SPRAY, METERED RESPIRATORY (INHALATION) at 05:00

## 2021-08-31 RX ADMIN — IPRATROPIUM BROMIDE AND ALBUTEROL 1 PUFF: 20; 100 SPRAY, METERED RESPIRATORY (INHALATION) at 08:56

## 2021-08-31 RX ADMIN — OXYCODONE HYDROCHLORIDE AND ACETAMINOPHEN 1000 MG: 500 TABLET ORAL at 08:20

## 2021-08-31 RX ADMIN — ZINC SULFATE 220 MG (50 MG) CAPSULE 1 CAPSULE: CAPSULE at 08:20

## 2021-08-31 RX ADMIN — DEXAMETHASONE SODIUM PHOSPHATE 6 MG: 4 INJECTION, SOLUTION INTRAMUSCULAR; INTRAVENOUS at 08:20

## 2021-08-31 NOTE — PROGRESS NOTES
Pharmacist Discharge Medication Reconciliation    Significant PMH: No past medical history on file. Encounter Diagnoses:   Encounter Diagnosis   Name Primary? Pneumonia due to COVID-19 virus Yes     Allergies: Erythromycin    Discharge Medications:   Current Discharge Medication List        START taking these medications    Details   dexAMETHasone (DECADRON) 6 mg tablet Take 1 Tablet by mouth Daily (before breakfast) for 7 days. Qty: 7 Tablet, Refills: 0  Start date: 8/31/2021, End date: 9/7/2021           CONTINUE these medications which have NOT CHANGED    Details   FLUoxetine (PROzac) 20 mg capsule Take 20 mg by mouth daily. buPROPion XL (WELLBUTRIN XL) 150 mg tablet Take 150 mg by mouth daily. The patient's chart, MAR and AVS were reviewed by Fabienne Hill [facility].     Discharging Provider: Bassem Garza MD    Thank you,     Fabienne Hill, [facility]

## 2021-08-31 NOTE — PROGRESS NOTES
Pharmacist Discharge Medication Reconciliation    Significant PMH: No past medical history on file. Encounter Diagnoses:   Encounter Diagnosis   Name Primary? Pneumonia due to COVID-19 virus Yes     Allergies: Erythromycin    Discharge Medications:   Current Discharge Medication List        START taking these medications    Details   dexAMETHasone (DECADRON) 6 mg tablet Take 1 Tablet by mouth Daily (before breakfast) for 7 days. Qty: 7 Tablet, Refills: 0  Start date: 8/31/2021, End date: 9/7/2021           CONTINUE these medications which have NOT CHANGED    Details   FLUoxetine (PROzac) 20 mg capsule Take 20 mg by mouth daily. buPROPion XL (WELLBUTRIN XL) 150 mg tablet Take 150 mg by mouth daily. The patient's chart, MAR and AVS were reviewed by Antonio Brunson [facility].     Discharging Provider: Yusuf Membreno MD    Thank you,     Antonio Brunson, [facility]

## 2021-08-31 NOTE — PROGRESS NOTES
Problem: Risk for Spread of Infection  Goal: Prevent transmission of infectious organism to others  Description: Prevent the transmission of infectious organisms to other patients, staff members, and visitors. Outcome: Progressing Towards Goal     Problem: Patient Education:  Go to Education Activity  Goal: Patient/Family Education  Outcome: Progressing Towards Goal     Problem: Airway Clearance - Ineffective  Goal: Achieve or maintain patent airway  Outcome: Progressing Towards Goal     Problem: Gas Exchange - Impaired  Goal: Absence of hypoxia  Outcome: Progressing Towards Goal  Goal: Promote optimal lung function  Outcome: Progressing Towards Goal     Problem: Breathing Pattern - Ineffective  Goal: Ability to achieve and maintain a regular respiratory rate  Outcome: Progressing Towards Goal     Problem:  Body Temperature -  Risk of, Imbalanced  Goal: Ability to maintain a body temperature within defined limits  Outcome: Progressing Towards Goal  Goal: Will regain or maintain usual level of consciousness  Outcome: Progressing Towards Goal  Goal: Complications related to the disease process, condition or treatment will be avoided or minimized  Outcome: Progressing Towards Goal     Problem: Isolation Precautions - Risk of Spread of Infection  Goal: Prevent transmission of infectious organism to others  Outcome: Progressing Towards Goal     Problem: Nutrition Deficits  Goal: Optimize nutrtional status  Outcome: Progressing Towards Goal     Problem: Risk for Fluid Volume Deficit  Goal: Maintain normal heart rhythm  Outcome: Progressing Towards Goal  Goal: Maintain absence of muscle cramping  Outcome: Progressing Towards Goal  Goal: Maintain normal serum potassium, sodium, calcium, phosphorus, and pH  Outcome: Progressing Towards Goal     Problem: Loneliness or Risk for Loneliness  Goal: Demonstrate positive use of time alone when socialization is not possible  Outcome: Progressing Towards Goal     Problem: Fatigue  Goal: Verbalize increase energy and improved vitality  Outcome: Progressing Towards Goal     Problem: Patient Education: Go to Patient Education Activity  Goal: Patient/Family Education  Outcome: Progressing Towards Goal     Problem: Patient Education: Go to Patient Education Activity  Goal: Patient/Family Education  Outcome: Progressing Towards Goal     Problem: Pneumonia: Day 1  Goal: Off Pathway (Use only if patient is Off Pathway)  Outcome: Progressing Towards Goal  Goal: Activity/Safety  Outcome: Progressing Towards Goal  Goal: Consults, if ordered  Outcome: Progressing Towards Goal  Goal: Diagnostic Test/Procedures  Outcome: Progressing Towards Goal  Goal: Nutrition/Diet  Outcome: Progressing Towards Goal  Goal: Medications  Outcome: Progressing Towards Goal  Goal: Respiratory  Outcome: Progressing Towards Goal  Goal: Treatments/Interventions/Procedures  Outcome: Progressing Towards Goal  Goal: Psychosocial  Outcome: Progressing Towards Goal  Goal: *Oxygen saturation within defined limits  Outcome: Progressing Towards Goal  Goal: *Influenza vaccine administered (October-March)  Outcome: Progressing Towards Goal  Goal: *Pneumoccocal vaccine administered  Outcome: Progressing Towards Goal  Goal: *Hemodynamically stable  Outcome: Progressing Towards Goal  Goal: *Demonstrates progressive activity  Outcome: Progressing Towards Goal  Goal: *Tolerating diet  Outcome: Progressing Towards Goal     Problem: Pneumonia: Day 2  Goal: Off Pathway (Use only if patient is Off Pathway)  Outcome: Progressing Towards Goal  Goal: Activity/Safety  Outcome: Progressing Towards Goal  Goal: Consults, if ordered  Outcome: Progressing Towards Goal  Goal: Diagnostic Test/Procedures  Outcome: Progressing Towards Goal  Goal: Nutrition/Diet  Outcome: Progressing Towards Goal  Goal: Discharge Planning  Outcome: Progressing Towards Goal  Goal: Medications  Outcome: Progressing Towards Goal  Goal: Respiratory  Outcome: Progressing Towards Goal  Goal: Treatments/Interventions/Procedures  Outcome: Progressing Towards Goal  Goal: Psychosocial  Outcome: Progressing Towards Goal  Goal: *Oxygen saturation within defined limits  Outcome: Progressing Towards Goal  Goal: *Hemodynamically stable  Outcome: Progressing Towards Goal  Goal: *Demonstrates progressive activity  Outcome: Progressing Towards Goal  Goal: *Tolerating diet  Outcome: Progressing Towards Goal  Goal: *Optimal pain control at patient's stated goal  Outcome: Progressing Towards Goal     Problem: Pneumonia: Day 3  Goal: Off Pathway (Use only if patient is Off Pathway)  Outcome: Progressing Towards Goal  Goal: Activity/Safety  Outcome: Progressing Towards Goal  Goal: Consults, if ordered  Outcome: Progressing Towards Goal  Goal: Diagnostic Test/Procedures  Outcome: Progressing Towards Goal  Goal: Nutrition/Diet  Outcome: Progressing Towards Goal  Goal: Discharge Planning  Outcome: Progressing Towards Goal  Goal: Medications  Outcome: Progressing Towards Goal  Goal: Respiratory  Outcome: Progressing Towards Goal  Goal: Treatments/Interventions/Procedures  Outcome: Progressing Towards Goal  Goal: Psychosocial  Outcome: Progressing Towards Goal  Goal: *Oxygen saturation within defined limits  Outcome: Progressing Towards Goal  Goal: *Hemodynamically stable  Outcome: Progressing Towards Goal  Goal: *Demonstrates progressive activity  Outcome: Progressing Towards Goal  Goal: *Tolerating diet  Outcome: Progressing Towards Goal  Goal: *Describes available resources and support systems  Outcome: Progressing Towards Goal  Goal: *Optimal pain control at patient's stated goal  Outcome: Progressing Towards Goal     Problem: Pneumonia: Day 4  Goal: Off Pathway (Use only if patient is Off Pathway)  Outcome: Progressing Towards Goal  Goal: Activity/Safety  Outcome: Progressing Towards Goal  Goal: Nutrition/Diet  Outcome: Progressing Towards Goal  Goal: Discharge Planning  Outcome: Progressing Towards Goal  Goal: Medications  Outcome: Progressing Towards Goal  Goal: Respiratory  Outcome: Progressing Towards Goal  Goal: Treatments/Interventions/Procedures  Outcome: Progressing Towards Goal  Goal: Psychosocial  Outcome: Progressing Towards Goal     Problem: Pneumonia: Discharge Outcomes  Goal: *Demonstrates progressive activity  Outcome: Progressing Towards Goal  Goal: *Describes follow-up/return visits to physicians  Outcome: Progressing Towards Goal  Goal: *Tolerating diet  Outcome: Progressing Towards Goal  Goal: *Verbalizes name, dosage, time, side effects, and number of days to continue medications  Outcome: Progressing Towards Goal  Goal: *Influenza immunization  Outcome: Progressing Towards Goal  Goal: *Pneumococcal immunization  Outcome: Progressing Towards Goal  Goal: *Respiratory status at baseline  Outcome: Progressing Towards Goal  Goal: *Vital signs within defined limits  Outcome: Progressing Towards Goal  Goal: *Describes available resources and support systems  Outcome: Progressing Towards Goal  Goal: *Optimal pain control at patient's stated goal  Outcome: Progressing Towards Goal

## 2021-08-31 NOTE — DISCHARGE SUMMARY
Hospitalist Discharge Summary     Patient ID:  Laurie Rivers  [id number]  1 y.o.  1970 8/27/2021    PCP on record: Daly Campoverde DO    Admit date: 8/27/2021  Discharge date and time: 8/31/2021    DISCHARGE DIAGNOSIS:    Acute hypoxic respiratory failure, secondary to COVID-19 PNA POA          Hypokalemia    CONSULTATIONS:  IP CONSULT TO HOSPITALIST  IP CONSULT TO PULMONOLOGY    Excerpted HPI from H&P of Jacklin Mcburney, MD:    Sherley Tim is a 46 y.o.  female who presents with CC listed above. Pt states that she has been feeling unwell for the past 12 days. She has NOT been vaccinated against SARS-CoV2. She believes she contracted the virus while at a party in [place]. She states that she was tested at [facility] last Wednesday and found to be positive. She states that her 6year old son is also ill but that he is doing better. She endorses PATEL and a non productive cough.      ______________________________________________________________________  DISCHARGE SUMMARY/HOSPITAL COURSE:  for full details see H&P, daily progress notes, labs, consult notes. Acute hypoxic respiratory failure, secondary to COVID-19 PNA POA   Continue Decadron 6 mg daily to complete total of 10 days  Patient is outside window for Actemra as per hospital criteria  Chest x-ray showed bilateral interstitial airspace infiltrates which is consistent with Covid pneumonia. She passed home oxygen challenge  Has been on room air for 24 hours  She feels comfortable going home now  Information about masking and social distancing was given.     Hypokalemia  corrected            _______________________________________________________________________  Patient seen and examined by me on discharge day. Pertinent Findings:  Gen:    Not in distress  Chest: Clear lungs  CVS:   Regular rhythm.   No edema  Abd:  Soft, not distended, not tender  Neuro:  Alert, _______________________________________________________________________  DISCHARGE MEDICATIONS:   Discharge Medication List as of 8/31/2021 10:01 AM      START taking these medications    Details   dexAMETHasone (DECADRON) 6 mg tablet Take 1 Tablet by mouth Daily (before breakfast) for 7 days. , Normal, Disp-7 Tablet, R-0         CONTINUE these medications which have NOT CHANGED    Details   FLUoxetine (PROzac) 20 mg capsule Take 20 mg by mouth daily. , Historical Med      buPROPion XL (WELLBUTRIN XL) 150 mg tablet Take 150 mg by mouth daily. , Historical Med               Patient Follow Up Instructions: Activity: Activity as tolerated  Diet: Resume previous diet  Wound Care: None needed        Follow-up Information     Follow up With Specialties Details Why Pilar Goss,  Family Medicine  The office will call you directly for follow up PCP appointment scheduling.  [address] [facility]  [phone]          ________________________________________________________________    Risk of deterioration: Low    Condition at Discharge:  Stable  __________________________________________________________________    Disposition  Home with family, no needs    ____________________________________________________________________    Code Status: Full Code  ___________________________________________________________________      Total time in minutes spent coordinating this discharge (includes going over instructions, follow-up, prescriptions, and preparing report for sign off to her PCP) :  >30 minutes    Signed:  Khalif Smith MD

## 2021-08-31 NOTE — PROGRESS NOTES
Pt being discharged home via family transportation. IV and telemetry discontinued. Discharge instructions and follow up appts given and explained to pt. Pt instructed to isolate for 10 days after discharge.

## 2021-08-31 NOTE — PROGRESS NOTES
Transition of Care Plan:     RUR: 8% low risk  Disposition: Home with family assistance  Follow up appointments: PCP  DME needed: No needs identified. Transportation at Discharge: Pt is unsure  Keys or means to access home:  Pt has keys      IM Medicare Letter: No  Is patient a BCPI-A Bundle:   No                   If yes, was Bundle Letter given?:     Caregiver Contact: [employer] ([phone])  Discharge Caregiver contacted prior to discharge? Pt contacted cg    CM acknowledged d/c orders. Pt will d/c home with family assistance. Pt's f/u information is in her AVS. Pt reports her friend Shivani Jeff will transport her home at d/c. Pt had no additional questions for CM. No further needs identified at this time. Patient is ready to d/c on a CM standpoint. RN aware. Care Management Interventions  PCP Verified by CM: Yes  Mode of Transport at Discharge:  Other (see comment) (Pt's friend)  Transition of Care Consult (CM Consult): Discharge Planning  Discharge Durable Medical Equipment: No  Occupational Therapy Consult: No  Speech Therapy Consult: No  Current Support Network: Lives Alone  Confirm Follow Up Transport: Self  Discharge Location  Discharge Placement: Home with family assistance    RAMILA Parker  Care Manager [id number] includes the [facility]  [phone]

## 2021-09-01 ENCOUNTER — PATIENT OUTREACH (OUTPATIENT)
Dept: CASE MANAGEMENT | Age: 51
End: 2021-09-01

## 2021-09-14 ENCOUNTER — PATIENT OUTREACH (OUTPATIENT)
Dept: CASE MANAGEMENT | Age: 51
End: 2021-09-14

## 2021-09-14 NOTE — PROGRESS NOTES
CTN has not been able to reach patient after x 3 attempts. CTN resolving post discharge episode. CTN will not make further attempts to reach.

## 2021-09-30 ENCOUNTER — HOSPITAL ENCOUNTER (OUTPATIENT)
Dept: GENERAL RADIOLOGY | Age: 51
Discharge: HOME | End: 2021-09-30
Payer: COMMERCIAL

## 2021-09-30 ENCOUNTER — TRANSCRIBE ORDER (OUTPATIENT)
Dept: REGISTRATION | Age: 51
End: 2021-09-30

## 2021-09-30 DIAGNOSIS — Z86.16 HISTORY OF 2019 NOVEL CORONAVIRUS DISEASE (COVID-19): ICD-10-CM

## 2021-09-30 DIAGNOSIS — Z87.01 PERSONAL HISTORY OF PNEUMONIA (RECURRENT): ICD-10-CM

## 2021-09-30 DIAGNOSIS — Z87.01 PERSONAL HISTORY OF PNEUMONIA (RECURRENT): Primary | ICD-10-CM

## 2021-09-30 PROCEDURE — 71046 X-RAY EXAM CHEST 2 VIEWS: CPT

## 2022-03-10 NOTE — ED NOTES
Bedside shift change report given to Hodan Roper RN (oncoming nurse) by [facility], RN (offgoing nurse). Report included the following information SBAR, Kardex, ED Summary, [facility] and Recent Results. 12:13 AM: TRANSFER - OUT REPORT:    Verbal report given to Omega Duncan RN(name) on [facility]  being transferred to [facility](unit) for routine progression of care       Report consisted of patients Situation, Background, Assessment and   Recommendations(SBAR). Information from the following report(s) SBAR, Kardex, ED Summary, [facility] and Recent Results was reviewed with the receiving nurse. Lines:   Peripheral IV 08/27/21 Left Antecubital (Active)        Opportunity for questions and clarification was provided.       Patient transported with:   [employer] [FreeTextEntry3] : I personally saw and examined MANJIT CASTILLO in detail.  I spoke to LUMA Stern regarding the assessment and plan of care. I performed the procedures and relevant physical exam.  I have reviewed the above assessment and plan of care and I agree.  I have made changes to the body of the note wherever necessary and appropriate.

## 2022-12-20 ENCOUNTER — HOSPITAL ENCOUNTER (EMERGENCY)
Age: 52
Discharge: HOME | End: 2022-12-20
Attending: EMERGENCY MEDICINE
Payer: COMMERCIAL

## 2022-12-20 VITALS
SYSTOLIC BLOOD PRESSURE: 111 MMHG | HEART RATE: 69 BPM | WEIGHT: 190 LBS | DIASTOLIC BLOOD PRESSURE: 66 MMHG | TEMPERATURE: 98.4 F | HEIGHT: 61 IN | RESPIRATION RATE: 24 BRPM | OXYGEN SATURATION: 100 % | BODY MASS INDEX: 35.87 KG/M2

## 2022-12-20 DIAGNOSIS — Z79.899 ON ANGIOTENSIN-CONVERTING ENZYME (ACE) INHIBITORS: ICD-10-CM

## 2022-12-20 DIAGNOSIS — R22.0 LIP SWELLING: Primary | ICD-10-CM

## 2022-12-20 PROCEDURE — 99284 EMERGENCY DEPT VISIT MOD MDM: CPT

## 2022-12-20 PROCEDURE — 74011250636 HC RX REV CODE- 250/636: Performed by: PHYSICIAN ASSISTANT

## 2022-12-20 PROCEDURE — 96374 THER/PROPH/DIAG INJ IV PUSH: CPT

## 2022-12-20 RX ADMIN — METHYLPREDNISOLONE SODIUM SUCCINATE 125 MG: 125 INJECTION, POWDER, FOR SOLUTION INTRAMUSCULAR; INTRAVENOUS at 13:21

## 2022-12-20 NOTE — DISCHARGE INSTRUCTIONS
Stop taking lisinopril  Return precautions as we discussed       Thank You! It was a pleasure taking care of you in our Emergency Department today. We know that when you come to [facility], you are entrusting us with your health, comfort, and safety. Our clinicians honor that trust, and truly appreciate the opportunity to care for you and your loved ones. We also value your feedback. If you receive a survey about your Emergency Department experience today, please fill it out. We care about our patients' feedback, and we listen to what you have to say. Thank you.     Solitario Arroyo PA-C

## 2022-12-20 NOTE — ED PROVIDER NOTES
EMERGENCY DEPARTMENT HISTORY AND PHYSICAL EXAM      Date: 12/20/2022  Patient Name: Josef Ventura    History of Presenting Illness     Chief Complaint   Patient presents with    Lip Swelling     Pt bottom lip swelling noticed today. Pt is on Lisinopril pt seen at [facility] who put an IV in, pt took benadryl an hour ago. Tongue is not swollen-pt completes sentences clearly without difficulty breathing. History Provided By: Patient    HPI: Josef Ventura, 46 y.o. female with PMHx of HTN on lisinopril, per patient and record review, presents  to the ED with cc of lower lip swelling since this morning. States she woke up this morning and was drinking her coffee around 745 when she felt that her lower lip felt a little bit more swollen than usual.  States she went to work and was on a call and by the end of that call she appreciated more swelling. States the swelling will worsen from early this morning until around 9:45 AM.  States she has not had any worsened swelling since that time. She took two benadryl just after symptom onset. States swelling is only to her lower lip. Denies any tongue swelling. Denies any sore throat, difficulty swallowing, chest pain, shortness of breath, wheezing, neck pain or stiffness, abdominal pain, N/V/D, rashes or weakness. Denies ever having experienced similar symptoms in the past.  Denies any known allergies. Denies any recent medication changes, although states she is on lisinopril. Denies any new soaps, lotions, detergents. No additional exacerbating or alleviating factors. No other complaints at this time. There are no other complaints, changes, or physical findings at this time. PCP: David Osullivan, DO    Current Outpatient Medications   Medication Sig Dispense Refill    FLUoxetine (PROzac) 20 mg capsule Take 20 mg by mouth daily. buPROPion XL (WELLBUTRIN XL) 150 mg tablet Take 150 mg by mouth daily.        Past History     Past Medical History:  No past medical history on file. Past Surgical History:  No past surgical history on file. Family History:  No family history on file. Social History: Allergies: Allergies   Allergen Reactions    Erythromycin Rash     Review of Systems   Review of Systems   Constitutional:  Negative for appetite change, chills and fever. HENT:  Positive for facial swelling. Negative for congestion, dental problem, drooling, sneezing, sore throat, trouble swallowing and voice change. Lower lip swelling    Eyes:  Negative for pain. Respiratory:  Negative for cough, shortness of breath and wheezing. Cardiovascular:  Negative for chest pain. Gastrointestinal:  Negative for abdominal pain, constipation, diarrhea, nausea and vomiting. Genitourinary:  Negative for difficulty urinating, dysuria, frequency, vaginal discharge and vaginal pain. Musculoskeletal:  Negative for neck pain and neck stiffness. Skin:  Negative for rash. Neurological:  Negative for syncope and headaches. All other systems reviewed and are negative. Physical Exam   Physical Exam  Vitals and nursing note reviewed. Constitutional:       General: She is not in acute distress. Appearance: Normal appearance. She is not ill-appearing, toxic-appearing or diaphoretic. Comments: 46 y.o. female   HENT:      Head: Normocephalic and atraumatic. Jaw: No trismus. Right Ear: External ear normal.      Left Ear: External ear normal.      Nose: Nose normal.      Mouth/Throat:      Mouth: Mucous membranes are moist.      Pharynx: Oropharynx is clear. No oropharyngeal exudate or posterior oropharyngeal erythema. Comments: Localized lower lip swelling. No tongue swelling. Oropharynx without posterior erythema, no tonsillar hypertrophy or exudate, uvula midline, airway widely patent. Tolerating secretions well. No Sarabjit's angina. Clear and coherent speech.    Eyes:      Extraocular Movements: Extraocular movements intact. Conjunctiva/sclera: Conjunctivae normal.   Neck:      Trachea: Trachea and phonation normal.   Cardiovascular:      Rate and Rhythm: Normal rate. Pulses: Normal pulses. Pulmonary:      Effort: Pulmonary effort is normal. No respiratory distress. Breath sounds: Normal breath sounds. No wheezing. Musculoskeletal:         General: Normal range of motion. Cervical back: Normal range of motion. No edema. Skin:     General: Skin is warm and dry. Neurological:      General: No focal deficit present. Mental Status: She is alert and oriented to person, place, and time. Psychiatric:         Mood and Affect: Mood normal.         Behavior: Behavior normal.     Diagnostic Study Results     Labs -   No results found for this or any previous visit (from the past 12 hour(s)). Radiologic Studies -   No orders to display     CT Results  (Last 48 hours)      None          CXR Results  (Last 48 hours)      None          Medical Decision Making   I am the first provider for this patient. I reviewed the vital signs, available nursing notes, past medical history, past surgical history, family history and social history. Vital Signs-Reviewed the patient's vital signs. Patient Vitals for the past 12 hrs:   Temp Pulse Resp BP SpO2   12/20/22 1415 -- 69 24 111/66 100 %   12/20/22 1241 98.4 °F (36.9 °C) 71 16 125/78 100 %       Pulse Oximetry Analysis - 100% on RA    Records Reviewed: Nursing Notes and Old Medical Records    Provider Notes (Medical Decision Making):   Patient is a very pleasant 80-year-old female presents ED for localized lower lip swelling since this morning. States maximum point of swelling was around 930 9:45 AM.  Denies any worsening swelling since that time. Patient took Benadryl prior to arrival.  Given IV Solu-Medrol here in the ED. Airway widely patent. Clear and coherent speech. Tolerating secretions well.   While here in the ER, swelling continue to improve. No hypoxia or increased work of breathing, breath sounds clear throughout. Just prior to discharge swelling had nearly resolved completely. Advised discontinuing lisinopril, and calling her PCP/cardiologist to keep them abreast of this discontinuation and today's visit. No evidence of emergent conditions requiring further evaluation/management acutely here at this time. Shared decision-making form and care plan created together, discussed diagnosis and treatment plan. Verbal return precautions advised. Patient verbalizes understanding and agreement of current plan of care. ED Course:   Initial assessment performed. The patients presenting problems have been discussed, and they are in agreement with the care plan formulated and outlined with them. I have encouraged them to ask questions as they arise throughout their visit. Case discussed with attending physician     ED Course as of 12/20/22 1707   Tue Dec 20, 2022   1417 On re-evaluation, patient has visible improvement in lower lip swelling. States she is feeling well and ready to go home. [TL]   7432 Patient states her lip seems to feel swollen again, will continue to monitor  [TL]   1516 On re-evaluation, patient states she is feeling much better, and swelling has nearly resolved. Marked improvement on inspection  [TL]      ED Course User Index  [TL] ALEXANDER Eldridge       Disposition:  Discharge     PLAN:  1. Discharge Medication List as of 12/20/2022  3:17 PM        2. Follow-up Information       Follow up With Specialties Details Why Contact Info    [facility] EMERGENCY DEPT Emergency Medicine  As needed [address] In 1 day  [address]   [phone]            Return to ED if worse     Diagnosis     Clinical Impression:   1. Lip swelling    2.  On angiotensin-converting enzyme (ACE) inhibitors

## 2022-12-20 NOTE — Clinical Note
[address]  [facility] EMERGENCY DEPT  [address] [facility]  [place] [id number] [phone]    Work/School Note    Date: 12/20/2022    To Whom It May concern:      Orelia Gottron was seen and treated today in the emergency room by the following provider(s):  Attending Provider: Linda Badillo MD  Physician Assistant: Murphy Harper. Orelia Gottron is excused from work/school on 12/20/22. She is clear to return to work/school on 12/21/22.         Sincerely,          ALEXANDER Cuevas

## 2022-12-20 NOTE — Clinical Note
[address]  [facility] EMERGENCY DEPT  [address] [facility]  [place] [id number] [phone]    Work/School Note    Date: 12/20/2022    To Whom It May concern:      María Matos was seen and treated today in the emergency room by the following provider(s):  Attending Provider: Melissa Wick MD  Physician Assistant: David Santanama. María Matos is excused from work/school on 12/20/22. She is clear to return to work/school on 12/21/22.         Sincerely,          ALEXANDER Zeng

## 2024-09-24 ENCOUNTER — TELEPHONE (OUTPATIENT)
Age: 54
End: 2024-09-24

## 2024-09-25 ENCOUNTER — OFFICE VISIT (OUTPATIENT)
Age: 54
End: 2024-09-25
Payer: COMMERCIAL

## 2024-09-25 VITALS
DIASTOLIC BLOOD PRESSURE: 69 MMHG | OXYGEN SATURATION: 97 % | HEART RATE: 83 BPM | RESPIRATION RATE: 17 BRPM | TEMPERATURE: 99.2 F | BODY MASS INDEX: 39.91 KG/M2 | WEIGHT: 211.4 LBS | HEIGHT: 61 IN | SYSTOLIC BLOOD PRESSURE: 102 MMHG

## 2024-09-25 DIAGNOSIS — C43.72 MELANOMA OF LEFT POSTERIOR CALF (HCC): Primary | ICD-10-CM

## 2024-09-25 PROCEDURE — 99203 OFFICE O/P NEW LOW 30 MIN: CPT | Performed by: SURGERY

## 2024-09-25 ASSESSMENT — PATIENT HEALTH QUESTIONNAIRE - PHQ9
2. FEELING DOWN, DEPRESSED OR HOPELESS: SEVERAL DAYS
SUM OF ALL RESPONSES TO PHQ QUESTIONS 1-9: 1
1. LITTLE INTEREST OR PLEASURE IN DOING THINGS: NOT AT ALL
SUM OF ALL RESPONSES TO PHQ QUESTIONS 1-9: 1
SUM OF ALL RESPONSES TO PHQ9 QUESTIONS 1 & 2: 1
SUM OF ALL RESPONSES TO PHQ QUESTIONS 1-9: 1
SUM OF ALL RESPONSES TO PHQ QUESTIONS 1-9: 1

## 2024-09-26 ENCOUNTER — TELEPHONE (OUTPATIENT)
Age: 54
End: 2024-09-26

## 2024-09-26 PROBLEM — C43.72 MALIGNANT MELANOMA OF LEFT LOWER LEG (HCC): Status: ACTIVE | Noted: 2024-09-26

## 2024-09-26 RX ORDER — TIRZEPATIDE 7.5 MG/.5ML
INJECTION, SOLUTION SUBCUTANEOUS
COMMUNITY